# Patient Record
Sex: FEMALE | Race: WHITE | NOT HISPANIC OR LATINO | Employment: UNEMPLOYED | URBAN - METROPOLITAN AREA
[De-identification: names, ages, dates, MRNs, and addresses within clinical notes are randomized per-mention and may not be internally consistent; named-entity substitution may affect disease eponyms.]

---

## 2017-02-27 ENCOUNTER — OFFICE VISIT (OUTPATIENT)
Dept: FAMILY MEDICINE CLINIC | Facility: CLINIC | Age: 57
End: 2017-02-27

## 2017-02-27 VITALS
SYSTOLIC BLOOD PRESSURE: 130 MMHG | BODY MASS INDEX: 33.75 KG/M2 | DIASTOLIC BLOOD PRESSURE: 85 MMHG | WEIGHT: 210 LBS | HEART RATE: 77 BPM | OXYGEN SATURATION: 98 % | HEIGHT: 66 IN | TEMPERATURE: 98.1 F

## 2017-02-27 DIAGNOSIS — Z79.899 DRUG THERAPY: ICD-10-CM

## 2017-02-27 DIAGNOSIS — R53.83 OTHER FATIGUE: ICD-10-CM

## 2017-02-27 DIAGNOSIS — L72.3 INFECTED SEBACEOUS CYST: ICD-10-CM

## 2017-02-27 DIAGNOSIS — I10 ESSENTIAL HYPERTENSION: Primary | ICD-10-CM

## 2017-02-27 DIAGNOSIS — Z00.00 HEALTH CARE MAINTENANCE: ICD-10-CM

## 2017-02-27 DIAGNOSIS — L08.9 INFECTED SEBACEOUS CYST: ICD-10-CM

## 2017-02-27 PROCEDURE — 99213 OFFICE O/P EST LOW 20 MIN: CPT | Performed by: FAMILY MEDICINE

## 2017-02-27 PROCEDURE — 10060 I&D ABSCESS SIMPLE/SINGLE: CPT | Performed by: FAMILY MEDICINE

## 2017-02-27 RX ORDER — CEPHALEXIN 500 MG/1
500 CAPSULE ORAL 4 TIMES DAILY
Qty: 40 CAPSULE | Refills: 0 | Status: SHIPPED | OUTPATIENT
Start: 2017-02-27 | End: 2018-02-26

## 2017-02-27 NOTE — PROGRESS NOTES
Regan Nur is a 56 y.o. female. Presents today for   Chief Complaint   Patient presents with   • Hypertension     pt is here for follow up visit regarding her hypertension, also she has a spot on her back she needs looked at.       Rash   This is a new problem. Episode onset: 3 weeks. The problem has been gradually worsening since onset. The affected locations include the back. The rash is characterized by itchiness, redness and swelling. She was exposed to nothing. Pertinent negatives include no fever or shortness of breath. (Patient concerned as history of melanoma and has had resection in past.  Has had 13 or 14 biopsies.  Called dermatology and cannot get in until July 2017. ) Past treatments include nothing. The treatment provided no relief.   Hypertension   This is a chronic problem. The current episode started more than 1 year ago. The problem is unchanged. The problem is controlled. Associated symptoms include malaise/fatigue (Reports fatigued, would like thyroid checked as tired all time.). Pertinent negatives include no chest pain, orthopnea, palpitations, peripheral edema, PND or shortness of breath. (Has insurance now and due for labs.) There are no associated agents to hypertension. Risk factors for coronary artery disease include post-menopausal state. Past treatments include diuretics. The current treatment provides moderate improvement. There is no history of CAD/MI or CVA.    Has thickened nail would like looked at.    Review of Systems   Constitutional: Positive for malaise/fatigue (Reports fatigued, would like thyroid checked as tired all time.). Negative for fever.   Respiratory: Negative for shortness of breath.    Cardiovascular: Negative for chest pain, palpitations, orthopnea and PND.   Skin: Positive for rash.        Skin lesion;  Fungus on toenail       The following portions of the patient's history were reviewed and updated as appropriate: allergies, current medications,  "past medical history and problem list.    Patient Active Problem List   Diagnosis   • Essential hypertension   • Depression with anxiety   • Atypical chest pain   • Adult body mass index greater than 30   • Elevated blood-pressure reading without diagnosis of hypertension   • Adiposity   • Overweight   • Encounter for screening for other disorder       No Known Allergies    Current Outpatient Prescriptions on File Prior to Visit   Medication Sig Dispense Refill   • buPROPion XL (WELLBUTRIN XL) 300 MG 24 hr tablet Take 1 tablet by mouth Daily. 30 tablet 1   • hydrochlorothiazide (HYDRODIURIL) 25 MG tablet Take 1 tablet by mouth Daily. 30 tablet 2   • [DISCONTINUED] amLODIPine (NORVASC) 5 MG tablet      • albuterol (PROVENTIL HFA;VENTOLIN HFA) 108 (90 BASE) MCG/ACT inhaler ProAir  (90 Base) MCG/ACT Inhalation Aerosol Solution; Patient Sig: ProAir  (90 Base) MCG/ACT Inhalation Aerosol Solution ; 8; 0; 14-Nov-2015; Active     • fexofenadine (ALLEGRA) 180 MG tablet TAKE (1) TABLET DAILY 30 tablet 10   • montelukast (SINGULAIR) 10 MG tablet TAKE 1 TABLET ONCE DAILY AS DIRECTED. 30 tablet 10   • [DISCONTINUED] phentermine 37.5 MG capsule Take 1 capsule by mouth every morning. 90 capsule 1     No current facility-administered medications on file prior to visit.        Objective   Vitals:    02/27/17 1803 02/27/17 1826   BP: 146/90 130/85   BP Location: Left arm    Patient Position: Sitting    Cuff Size: Large Adult    Pulse: 77    Temp: 98.1 °F (36.7 °C)    TempSrc: Oral    SpO2: 98%    Weight: 210 lb (95.3 kg)    Height: 65.5\" (166.4 cm)        Physical Exam   Constitutional: She appears well-developed and well-nourished.   HENT:   Head: Normocephalic and atraumatic.   Neck: Neck supple. No JVD present. No thyromegaly present.   Cardiovascular: Normal rate, regular rhythm and normal heart sounds.  Exam reveals no gallop and no friction rub.    No murmur heard.  Pulmonary/Chest: Effort normal and breath sounds " normal. No respiratory distress. She has no wheezes. She has no rales.   Abdominal: Soft. Bowel sounds are normal. She exhibits no distension. There is no tenderness. There is no rebound and no guarding.   Musculoskeletal: She exhibits no edema.   Neurological: She is alert.   Skin: Skin is warm and dry.   Hallux nail thickened consistent with onychomycosis.  Low back 2 x 3 cm area of redness, mild warmth with underlying fluctuance.   Psychiatric: She has a normal mood and affect. Her behavior is normal.   Nursing note and vitals reviewed.  Incision & Drainage  Date/Time: 2/27/2017 7:55 PM  Performed by: ASIA DELEON  Authorized by: ASIA DELEON   Consent: Verbal consent obtained.  Risks and benefits: risks, benefits and alternatives were discussed  Consent given by: patient  Patient understanding: patient states understanding of the procedure being performed  Imaging studies: imaging studies available  Required items: required blood products, implants, devices, and special equipment available  Patient identity confirmed: verbally with patient  Type: abscess  Body area: trunk  Location details: back  Anesthesia: local infiltration    Anesthesia:  Anesthesia: local infiltration  Local Anesthetic: lidocaine 2% with epinephrine   Anesthetic total: 6 mL  Sedation:  Patient sedated: no    Scalpel size: 11  Needle gauge: 25 G.  Incision type: single straight  Drainage: purulent (cheesy material)  Drainage amount: moderate  Wound treatment: wound left open (covered with abx oint and sterile dressing.)  Packing material: none  Patient tolerance: Patient tolerated the procedure well with no immediate complications            Assessment/Plan   Loren was seen today for hypertension.    Diagnoses and all orders for this visit:    Essential hypertension  -     Comprehensive Metabolic Panel    Other fatigue  -     CBC & Differential  -     Comprehensive Metabolic Panel  -     TSH  -     Vitamin B12  -     T3, Free  -      T4, Free    Health care maintenance  -     Lipid Panel    Drug therapy  -     CBC & Differential  -     Comprehensive Metabolic Panel    Infected sebaceous cyst  -     Culture, Routine  -     Incision & Drainage  -     cephalexin (KEFLEX) 500 MG capsule; Take 1 capsule by mouth 4 (Four) Times a Day.      -thickened nail - would just watch, consider lamisil  -bp adequate control, continue medication  -fatigue - will check labs  -reassured the material expressed was pathognomic for epidermoid inclusion cyst (sebaceous cyst).       -Follow up: 3 months       Current Outpatient Prescriptions:   •  buPROPion XL (WELLBUTRIN XL) 300 MG 24 hr tablet, Take 1 tablet by mouth Daily., Disp: 30 tablet, Rfl: 1  •  hydrochlorothiazide (HYDRODIURIL) 25 MG tablet, Take 1 tablet by mouth Daily., Disp: 30 tablet, Rfl: 2  •  albuterol (PROVENTIL HFA;VENTOLIN HFA) 108 (90 BASE) MCG/ACT inhaler, ProAir  (90 Base) MCG/ACT Inhalation Aerosol Solution; Patient Sig: ProAir  (90 Base) MCG/ACT Inhalation Aerosol Solution ; 8; 0; 14-Nov-2015; Active, Disp: , Rfl:   •  cephalexin (KEFLEX) 500 MG capsule, Take 1 capsule by mouth 4 (Four) Times a Day., Disp: 40 capsule, Rfl: 0  •  fexofenadine (ALLEGRA) 180 MG tablet, TAKE (1) TABLET DAILY, Disp: 30 tablet, Rfl: 10  •  montelukast (SINGULAIR) 10 MG tablet, TAKE 1 TABLET ONCE DAILY AS DIRECTED., Disp: 30 tablet, Rfl: 10

## 2017-02-28 ENCOUNTER — TELEPHONE (OUTPATIENT)
Dept: FAMILY MEDICINE CLINIC | Facility: CLINIC | Age: 57
End: 2017-02-28

## 2017-02-28 LAB
ALBUMIN SERPL-MCNC: 4.5 G/DL (ref 3.5–5.2)
ALBUMIN/GLOB SERPL: 2 G/DL
ALP SERPL-CCNC: 60 U/L (ref 39–117)
ALT SERPL-CCNC: 26 U/L (ref 1–33)
AST SERPL-CCNC: 18 U/L (ref 1–32)
BASOPHILS # BLD AUTO: 0.02 10*3/MM3 (ref 0–0.2)
BASOPHILS NFR BLD AUTO: 0.3 % (ref 0–1.5)
BILIRUB SERPL-MCNC: 0.4 MG/DL (ref 0.1–1.2)
BUN SERPL-MCNC: 16 MG/DL (ref 6–20)
BUN/CREAT SERPL: 17.2 (ref 7–25)
CALCIUM SERPL-MCNC: 9.6 MG/DL (ref 8.6–10.5)
CHLORIDE SERPL-SCNC: 100 MMOL/L (ref 98–107)
CHOLEST SERPL-MCNC: 190 MG/DL (ref 0–200)
CO2 SERPL-SCNC: 28 MMOL/L (ref 22–29)
CREAT SERPL-MCNC: 0.93 MG/DL (ref 0.57–1)
EOSINOPHIL # BLD AUTO: 0.1 10*3/MM3 (ref 0–0.7)
EOSINOPHIL NFR BLD AUTO: 1.4 % (ref 0.3–6.2)
ERYTHROCYTE [DISTWIDTH] IN BLOOD BY AUTOMATED COUNT: 13.4 % (ref 11.7–13)
GLOBULIN SER CALC-MCNC: 2.3 GM/DL
GLUCOSE SERPL-MCNC: 87 MG/DL (ref 65–99)
HCT VFR BLD AUTO: 44.1 % (ref 35.6–45.5)
HDLC SERPL-MCNC: 57 MG/DL (ref 40–60)
HGB BLD-MCNC: 14.6 G/DL (ref 11.9–15.5)
IMM GRANULOCYTES # BLD: 0.02 10*3/MM3 (ref 0–0.03)
IMM GRANULOCYTES NFR BLD: 0.3 % (ref 0–0.5)
LDLC SERPL CALC-MCNC: 113 MG/DL (ref 0–100)
LYMPHOCYTES # BLD AUTO: 1.3 10*3/MM3 (ref 0.9–4.8)
LYMPHOCYTES NFR BLD AUTO: 17.8 % (ref 19.6–45.3)
MCH RBC QN AUTO: 30.8 PG (ref 26.9–32)
MCHC RBC AUTO-ENTMCNC: 33.1 G/DL (ref 32.4–36.3)
MCV RBC AUTO: 93 FL (ref 80.5–98.2)
MONOCYTES # BLD AUTO: 0.69 10*3/MM3 (ref 0.2–1.2)
MONOCYTES NFR BLD AUTO: 9.4 % (ref 5–12)
NEUTROPHILS # BLD AUTO: 5.18 10*3/MM3 (ref 1.9–8.1)
NEUTROPHILS NFR BLD AUTO: 70.8 % (ref 42.7–76)
PLATELET # BLD AUTO: 206 10*3/MM3 (ref 140–500)
POTASSIUM SERPL-SCNC: 4.4 MMOL/L (ref 3.5–5.2)
PROT SERPL-MCNC: 6.8 G/DL (ref 6–8.5)
RBC # BLD AUTO: 4.74 10*6/MM3 (ref 3.9–5.2)
SODIUM SERPL-SCNC: 141 MMOL/L (ref 136–145)
TRIGL SERPL-MCNC: 102 MG/DL (ref 0–150)
TSH SERPL DL<=0.005 MIU/L-ACNC: 2.82 MIU/ML (ref 0.27–4.2)
VIT B12 SERPL-MCNC: 519 PG/ML (ref 211–946)
VLDLC SERPL CALC-MCNC: 20.4 MG/DL (ref 5–40)
WBC # BLD AUTO: 7.31 10*3/MM3 (ref 4.5–10.7)

## 2017-03-01 DIAGNOSIS — Z79.899 DRUG THERAPY: Primary | ICD-10-CM

## 2017-03-01 RX ORDER — TERBINAFINE HYDROCHLORIDE 250 MG/1
250 TABLET ORAL DAILY
Qty: 30 TABLET | Refills: 2 | Status: SHIPPED | OUTPATIENT
Start: 2017-03-01 | End: 2018-02-26

## 2017-03-02 ENCOUNTER — TELEPHONE (OUTPATIENT)
Dept: FAMILY MEDICINE CLINIC | Facility: CLINIC | Age: 57
End: 2017-03-02

## 2017-03-02 RX ORDER — TOPIRAMATE 50 MG/1
TABLET, FILM COATED ORAL
Qty: 30 TABLET | Refills: 1 | Status: SHIPPED | OUTPATIENT
Start: 2017-03-02 | End: 2018-02-26

## 2017-03-03 LAB
BACTERIA SPEC AEROBE CULT: ABNORMAL
BACTERIA SPEC CULT: ABNORMAL
OTHER ANTIBIOTIC SUSC ISLT: ABNORMAL
SPECIMEN STATUS: NORMAL
T3FREE SERPL-MCNC: NORMAL PG/ML
T4 FREE SERPL-MCNC: NORMAL NG/DL

## 2017-03-06 RX ORDER — SULFAMETHOXAZOLE AND TRIMETHOPRIM 800; 160 MG/1; MG/1
1 TABLET ORAL 2 TIMES DAILY
Qty: 14 TABLET | Refills: 0 | Status: SHIPPED | OUTPATIENT
Start: 2017-03-06 | End: 2018-02-26

## 2017-03-15 ENCOUNTER — TELEPHONE (OUTPATIENT)
Dept: FAMILY MEDICINE CLINIC | Facility: CLINIC | Age: 57
End: 2017-03-15

## 2017-03-16 NOTE — TELEPHONE ENCOUNTER
She had a sebaceous cyst that was infected.  Usually come back and only treat if infected.  Signs of infection would be redness over top, warmth and pain.  If none of this, I would just watch.  If redness, warmth and painful to touch or fevers, she needs to be seen.    RRJ

## 2017-03-29 ENCOUNTER — RESULTS ENCOUNTER (OUTPATIENT)
Dept: FAMILY MEDICINE CLINIC | Facility: CLINIC | Age: 57
End: 2017-03-29

## 2017-03-29 DIAGNOSIS — Z79.899 DRUG THERAPY: ICD-10-CM

## 2017-04-03 RX ORDER — HYDROCHLOROTHIAZIDE 25 MG/1
TABLET ORAL
Qty: 30 TABLET | Refills: 6 | Status: SHIPPED | OUTPATIENT
Start: 2017-04-03 | End: 2017-09-11 | Stop reason: SDUPTHER

## 2017-04-20 LAB
ALBUMIN SERPL-MCNC: 4.1 G/DL (ref 3.5–5.2)
ALP SERPL-CCNC: 58 U/L (ref 39–117)
ALT SERPL-CCNC: 22 U/L (ref 1–33)
AST SERPL-CCNC: 18 U/L (ref 1–32)
BILIRUB DIRECT SERPL-MCNC: <0.2 MG/DL (ref 0–0.3)
BILIRUB SERPL-MCNC: 0.3 MG/DL (ref 0.1–1.2)
PROT SERPL-MCNC: 6.9 G/DL (ref 6–8.5)

## 2017-04-24 ENCOUNTER — TELEPHONE (OUTPATIENT)
Dept: FAMILY MEDICINE CLINIC | Facility: CLINIC | Age: 57
End: 2017-04-24

## 2017-04-24 NOTE — TELEPHONE ENCOUNTER
----- Message from David Cantu DO sent at 4/22/2017  8:22 PM EDT -----  Call results to patient.  Hepatic function panel is normal.

## 2017-06-30 ENCOUNTER — TELEPHONE (OUTPATIENT)
Dept: FAMILY MEDICINE CLINIC | Facility: CLINIC | Age: 57
End: 2017-06-30

## 2017-08-21 DIAGNOSIS — F32.A DEPRESSION, UNSPECIFIED DEPRESSION TYPE: ICD-10-CM

## 2017-08-21 RX ORDER — BUPROPION HYDROCHLORIDE 300 MG/1
TABLET ORAL
Qty: 30 TABLET | Refills: 1 | Status: SHIPPED | OUTPATIENT
Start: 2017-08-21 | End: 2017-09-11 | Stop reason: SDUPTHER

## 2017-09-11 ENCOUNTER — TELEPHONE (OUTPATIENT)
Dept: FAMILY MEDICINE CLINIC | Facility: CLINIC | Age: 57
End: 2017-09-11

## 2017-09-11 DIAGNOSIS — F32.A DEPRESSION, UNSPECIFIED DEPRESSION TYPE: ICD-10-CM

## 2017-09-11 RX ORDER — MONTELUKAST SODIUM 10 MG/1
10 TABLET ORAL NIGHTLY
Qty: 180 TABLET | Refills: 1 | Status: SHIPPED | OUTPATIENT
Start: 2017-09-11 | End: 2018-12-10 | Stop reason: SDUPTHER

## 2017-09-11 RX ORDER — HYDROCHLOROTHIAZIDE 25 MG/1
25 TABLET ORAL DAILY
Qty: 180 TABLET | Refills: 1 | Status: SHIPPED | OUTPATIENT
Start: 2017-09-11 | End: 2018-10-08 | Stop reason: SDUPTHER

## 2017-09-11 RX ORDER — FEXOFENADINE HCL 180 MG/1
180 TABLET ORAL DAILY
Qty: 180 TABLET | Refills: 1 | Status: SHIPPED | OUTPATIENT
Start: 2017-09-11 | End: 2018-01-19 | Stop reason: SDUPTHER

## 2017-09-11 RX ORDER — BUPROPION HYDROCHLORIDE 300 MG/1
300 TABLET ORAL EVERY MORNING
Qty: 90 TABLET | Refills: 1 | Status: SHIPPED | OUTPATIENT
Start: 2017-09-11 | End: 2018-03-29 | Stop reason: SDUPTHER

## 2018-01-03 ENCOUNTER — TELEPHONE (OUTPATIENT)
Dept: FAMILY MEDICINE CLINIC | Facility: CLINIC | Age: 58
End: 2018-01-03

## 2018-01-03 RX ORDER — BENZONATATE 100 MG/1
100 CAPSULE ORAL 3 TIMES DAILY PRN
Qty: 15 CAPSULE | Refills: 0 | Status: SHIPPED | OUTPATIENT
Start: 2018-01-03 | End: 2018-02-26

## 2018-01-03 NOTE — TELEPHONE ENCOUNTER
I sent a few tessalon perles but if the cough continues she has to be seen before anything else can be called in.  I have openings tomorrow.

## 2018-01-19 ENCOUNTER — TELEPHONE (OUTPATIENT)
Dept: FAMILY MEDICINE CLINIC | Facility: CLINIC | Age: 58
End: 2018-01-19

## 2018-01-19 RX ORDER — FEXOFENADINE HCL 180 MG/1
TABLET ORAL
Qty: 30 TABLET | Refills: 0 | Status: SHIPPED | OUTPATIENT
Start: 2018-01-19 | End: 2018-03-01 | Stop reason: SDUPTHER

## 2018-01-19 RX ORDER — AMOXICILLIN 875 MG/1
875 TABLET, COATED ORAL EVERY 12 HOURS SCHEDULED
Qty: 20 TABLET | Refills: 0 | Status: SHIPPED | OUTPATIENT
Start: 2018-01-19 | End: 2018-01-29

## 2018-02-19 ENCOUNTER — TELEPHONE (OUTPATIENT)
Dept: FAMILY MEDICINE CLINIC | Facility: CLINIC | Age: 58
End: 2018-02-19

## 2018-02-20 RX ORDER — AMOXICILLIN 875 MG/1
875 TABLET, COATED ORAL EVERY 12 HOURS SCHEDULED
Qty: 14 TABLET | Refills: 0 | Status: SHIPPED | OUTPATIENT
Start: 2018-02-20 | End: 2018-05-15

## 2018-02-20 NOTE — TELEPHONE ENCOUNTER
Her  has end stage dementia and can no longer leave him and too hard to travel now as seizures.   I can send something, but can she come in a late Monday to double check as could be something very serious?    Amoxil 875mg 1 po bid x 7 days - until I can see next Monday night

## 2018-02-26 ENCOUNTER — OFFICE VISIT (OUTPATIENT)
Dept: FAMILY MEDICINE CLINIC | Facility: CLINIC | Age: 58
End: 2018-02-26

## 2018-02-26 VITALS
BODY MASS INDEX: 33.75 KG/M2 | WEIGHT: 210 LBS | HEIGHT: 66 IN | HEART RATE: 96 BPM | OXYGEN SATURATION: 94 % | DIASTOLIC BLOOD PRESSURE: 80 MMHG | TEMPERATURE: 98.1 F | SYSTOLIC BLOOD PRESSURE: 120 MMHG

## 2018-02-26 DIAGNOSIS — I10 ESSENTIAL HYPERTENSION: ICD-10-CM

## 2018-02-26 DIAGNOSIS — L03.213 CELLULITIS OF PERIORBITAL REGION OF BOTH EYES: Primary | ICD-10-CM

## 2018-02-26 DIAGNOSIS — Z23 IMMUNIZATION DUE: ICD-10-CM

## 2018-02-26 PROCEDURE — 99213 OFFICE O/P EST LOW 20 MIN: CPT | Performed by: FAMILY MEDICINE

## 2018-02-26 PROCEDURE — 90686 IIV4 VACC NO PRSV 0.5 ML IM: CPT | Performed by: FAMILY MEDICINE

## 2018-02-26 PROCEDURE — 90471 IMMUNIZATION ADMIN: CPT | Performed by: FAMILY MEDICINE

## 2018-02-26 NOTE — PROGRESS NOTES
Regan Nur is a 57 y.o. female. Presents today for   Chief Complaint   Patient presents with   • Eye Problem     pt is here for eye infection       Eye Problem    Both eyes are affected.This is a new problem. The current episode started in the past 7 days. Episode frequency: resolved now. The problem has been resolved. There was no injury mechanism. The patient is experiencing no pain. There is known exposure to pink eye. She does not wear contacts. Associated symptoms include eye redness (periorbital redness and peeling, gone now). Pertinent negatives include no blurred vision, double vision, fever, nausea, recent URI or vomiting. Treatments tried: called in abx for aptient as not able to leave  and come during day.   The treatment provided moderate relief.   Hypertension   This is a chronic problem. The current episode started more than 1 year ago. The problem is unchanged. The problem is controlled. Pertinent negatives include no blurred vision, orthopnea, palpitations, peripheral edema, PND or shortness of breath. There are no associated agents to hypertension. Past treatments include diuretics. The current treatment provides moderate improvement. There are no compliance problems.        Review of Systems   Constitutional: Negative for fever.   Eyes: Positive for redness (periorbital redness and peeling, gone now). Negative for blurred vision and double vision.   Respiratory: Negative for shortness of breath.    Cardiovascular: Negative for palpitations, orthopnea and PND.   Gastrointestinal: Negative for nausea and vomiting.       The following portions of the patient's history were reviewed and updated as appropriate: allergies, current medications, past medical history and problem list.    Patient Active Problem List   Diagnosis   • Essential hypertension   • Depression with anxiety   • Atypical chest pain   • Adult body mass index greater than 30   • Elevated blood-pressure reading  "without diagnosis of hypertension   • Adiposity   • Overweight   • Encounter for screening for other disorder       No Known Allergies    Current Outpatient Prescriptions on File Prior to Visit   Medication Sig Dispense Refill   • albuterol (PROVENTIL HFA;VENTOLIN HFA) 108 (90 BASE) MCG/ACT inhaler ProAir  (90 Base) MCG/ACT Inhalation Aerosol Solution; Patient Sig: ProAir  (90 Base) MCG/ACT Inhalation Aerosol Solution ; 8; 0; 14-Nov-2015; Active     • amoxicillin (AMOXIL) 875 MG tablet Take 1 tablet by mouth Every 12 (Twelve) Hours. 14 tablet 0   • buPROPion XL (WELLBUTRIN XL) 300 MG 24 hr tablet Take 1 tablet by mouth Every Morning. 90 tablet 1   • fexofenadine (ALLEGRA) 180 MG tablet TAKE (1) TABLET DAILY 30 tablet 0   • hydrochlorothiazide (HYDRODIURIL) 25 MG tablet Take 1 tablet by mouth Daily. 180 tablet 1   • montelukast (SINGULAIR) 10 MG tablet Take 1 tablet by mouth Every Night. 180 tablet 1   • [DISCONTINUED] terbinafine (lamiSIL) 250 MG tablet Take 1 tablet by mouth Daily. 30 tablet 2   • [DISCONTINUED] topiramate (TOPAMAX) 50 MG tablet 1/2 at night for 5 days, then 1/2 twice daiy for 5 days, and then 1/2 in the am and 1 at night for 5 days, then bid 30 tablet 1   • [DISCONTINUED] benzonatate (TESSALON PERLES) 100 MG capsule Take 1 capsule by mouth 3 (Three) Times a Day As Needed for Cough. 15 capsule 0   • [DISCONTINUED] cephalexin (KEFLEX) 500 MG capsule Take 1 capsule by mouth 4 (Four) Times a Day. 40 capsule 0   • [DISCONTINUED] sulfamethoxazole-trimethoprim (BACTRIM DS) 800-160 MG per tablet Take 1 tablet by mouth 2 (Two) Times a Day. 14 tablet 0     No current facility-administered medications on file prior to visit.        Objective   Vitals:    02/26/18 1754   BP: 120/80   BP Location: Left arm   Patient Position: Sitting   Cuff Size: Large Adult   Pulse: 96   Temp: 98.1 °F (36.7 °C)   TempSrc: Oral   SpO2: 94%   Weight: 95.3 kg (210 lb)   Height: 166.4 cm (65.51\")       Physical Exam "   Constitutional: She is oriented to person, place, and time. She appears well-developed and well-nourished.   Eyes: Conjunctivae, EOM and lids are normal. Pupils are equal, round, and reactive to light.   Neurological: She is alert and oriented to person, place, and time.   Skin: Skin is warm and dry.   Psychiatric: She has a normal mood and affect. Her behavior is normal.   Nursing note and vitals reviewed.      Assessment/Plan   Loren was seen today for eye problem.    Diagnoses and all orders for this visit:    Cellulitis of periorbital region of both eyes    Immunization due  -     Flu Vaccine Quad PF 3YR+ (FLUARIX/FLUZONE 1102-6182)    Essential hypertension    -hypertension - controlled, continue medications  -skin around eye looks good now;  Take last dose of abx            -Follow up: 6 months       Current Outpatient Prescriptions:   •  albuterol (PROVENTIL HFA;VENTOLIN HFA) 108 (90 BASE) MCG/ACT inhaler, ProAir  (90 Base) MCG/ACT Inhalation Aerosol Solution; Patient Sig: ProAir  (90 Base) MCG/ACT Inhalation Aerosol Solution ; 8; 0; 14-Nov-2015; Active, Disp: , Rfl:   •  amoxicillin (AMOXIL) 875 MG tablet, Take 1 tablet by mouth Every 12 (Twelve) Hours., Disp: 14 tablet, Rfl: 0  •  buPROPion XL (WELLBUTRIN XL) 300 MG 24 hr tablet, Take 1 tablet by mouth Every Morning., Disp: 90 tablet, Rfl: 1  •  fexofenadine (ALLEGRA) 180 MG tablet, TAKE (1) TABLET DAILY, Disp: 30 tablet, Rfl: 0  •  hydrochlorothiazide (HYDRODIURIL) 25 MG tablet, Take 1 tablet by mouth Daily., Disp: 180 tablet, Rfl: 1  •  montelukast (SINGULAIR) 10 MG tablet, Take 1 tablet by mouth Every Night., Disp: 180 tablet, Rfl: 1

## 2018-03-01 RX ORDER — FEXOFENADINE HCL 180 MG/1
TABLET ORAL
Qty: 30 TABLET | Refills: 6 | Status: SHIPPED | OUTPATIENT
Start: 2018-03-01 | End: 2021-04-22

## 2018-03-29 DIAGNOSIS — F32.A DEPRESSION, UNSPECIFIED DEPRESSION TYPE: ICD-10-CM

## 2018-03-29 RX ORDER — BUPROPION HYDROCHLORIDE 300 MG/1
TABLET ORAL
Qty: 30 TABLET | Refills: 6 | Status: SHIPPED | OUTPATIENT
Start: 2018-03-29 | End: 2018-11-14 | Stop reason: SDUPTHER

## 2018-05-07 RX ORDER — DOXYCYCLINE HYCLATE 100 MG/1
100 CAPSULE ORAL 2 TIMES DAILY
Qty: 20 CAPSULE | Refills: 0 | Status: SHIPPED | OUTPATIENT
Start: 2018-05-07 | End: 2018-05-15

## 2018-05-07 NOTE — PROGRESS NOTES
Patient here with , has small infected sebaceous cyst, directed to use warm compresses and will send in abx.  RRJ

## 2018-05-15 ENCOUNTER — OFFICE VISIT (OUTPATIENT)
Dept: FAMILY MEDICINE CLINIC | Facility: CLINIC | Age: 58
End: 2018-05-15

## 2018-05-15 ENCOUNTER — TELEPHONE (OUTPATIENT)
Dept: FAMILY MEDICINE CLINIC | Facility: CLINIC | Age: 58
End: 2018-05-15

## 2018-05-15 VITALS
BODY MASS INDEX: 35.22 KG/M2 | TEMPERATURE: 98.6 F | OXYGEN SATURATION: 98 % | WEIGHT: 215 LBS | HEART RATE: 76 BPM | DIASTOLIC BLOOD PRESSURE: 76 MMHG | SYSTOLIC BLOOD PRESSURE: 130 MMHG

## 2018-05-15 DIAGNOSIS — L08.9 INFECTED SEBACEOUS CYST: Primary | ICD-10-CM

## 2018-05-15 DIAGNOSIS — L72.3 INFECTED SEBACEOUS CYST: Primary | ICD-10-CM

## 2018-05-15 PROCEDURE — 99213 OFFICE O/P EST LOW 20 MIN: CPT | Performed by: FAMILY MEDICINE

## 2018-05-15 RX ORDER — SULFAMETHOXAZOLE AND TRIMETHOPRIM 800; 160 MG/1; MG/1
1 TABLET ORAL 2 TIMES DAILY
Qty: 20 TABLET | Refills: 0 | Status: SHIPPED | OUTPATIENT
Start: 2018-05-15 | End: 2019-04-23

## 2018-05-15 NOTE — PROGRESS NOTES
Regan Nur is a 57 y.o. female. Presents today for   Chief Complaint   Patient presents with   • Follow-up     lesion with drainage on her back       Cyst   This is a recurrent (had infected sebacous cyst in past same location) problem. The current episode started 1 to 4 weeks ago. The problem occurs constantly. The problem has been unchanged. Pertinent negatives include no chills, fever, nausea or vomiting. Associated symptoms comments: Painful, hot to touch. Nothing aggravates the symptoms. Treatments tried: on abx. The treatment provided no relief.       Review of Systems   Constitutional: Negative for chills and fever.   Gastrointestinal: Negative for nausea and vomiting.       The following portions of the patient's history were reviewed and updated as appropriate: allergies, current medications, past medical history and problem list.    Patient Active Problem List   Diagnosis   • Essential hypertension   • Depression with anxiety   • Atypical chest pain   • Adult body mass index greater than 30   • Elevated blood-pressure reading without diagnosis of hypertension   • Adiposity   • Overweight   • Encounter for screening for other disorder       No Known Allergies    Current Outpatient Prescriptions on File Prior to Visit   Medication Sig Dispense Refill   • albuterol (PROVENTIL HFA;VENTOLIN HFA) 108 (90 BASE) MCG/ACT inhaler ProAir  (90 Base) MCG/ACT Inhalation Aerosol Solution; Patient Sig: ProAir  (90 Base) MCG/ACT Inhalation Aerosol Solution ; 8; 0; 14-Nov-2015; Active     • buPROPion XL (WELLBUTRIN XL) 300 MG 24 hr tablet TAKE 1 TABLET IN THE MORNING 30 tablet 6   • fexofenadine (ALLEGRA) 180 MG tablet TAKE (1) TABLET DAILY 30 tablet 6   • hydrochlorothiazide (HYDRODIURIL) 25 MG tablet Take 1 tablet by mouth Daily. 180 tablet 1   • montelukast (SINGULAIR) 10 MG tablet Take 1 tablet by mouth Every Night. 180 tablet 1     No current facility-administered medications on file  prior to visit.        Objective   Vitals:    05/15/18 1554   BP: 130/76   Pulse: 76   Temp: 98.6 °F (37 °C)   SpO2: 98%   Weight: 97.5 kg (215 lb)         Physical Exam   Constitutional: She is oriented to person, place, and time. She appears well-developed and well-nourished.   Neurological: She is alert and oriented to person, place, and time.   Skin: Skin is warm and dry.        Psychiatric: She has a normal mood and affect. Her behavior is normal.   Nursing note and vitals reviewed.    Incision & Drainage  Date/Time: 5/20/2018 6:57 AM  Performed by: ASIA DELEON  Authorized by: ASIA DELEON   Consent: Verbal consent obtained.  Risks and benefits: risks, benefits and alternatives were discussed  Consent given by: patient  Patient understanding: patient states understanding of the procedure being performed  Site marked: the operative site was marked  Required items: required blood products, implants, devices, and special equipment available  Patient identity confirmed: verbally with patient  Type: abscess  Body area: upper extremity  Location details: left wrist  Anesthesia: local infiltration    Anesthesia:  Local Anesthetic: lidocaine 2% without epinephrine  Anesthetic total: 5 mL    Sedation:  Patient sedated: no  Scalpel size: 10  Incision type: single straight  Drainage: purulent (cheesy material (keritin debris).)  Drainage amount: copious  Wound treatment: wound left open  Packing material: none  Patient tolerance: Patient tolerated the procedure well with no immediate complications          Assessment/Plan   Loren was seen today for follow-up.    Diagnoses and all orders for this visit:    Infected sebaceous cyst  -     sulfamethoxazole-trimethoprim (BACTRIM DS,SEPTRA DS) 800-160 MG per tablet; Take 1 tablet by mouth 2 (Two) Times a Day.  -     Culture, Routine - Swab, Back  -     Incision & Drainage    Other orders  -     Please Note    abx as directed, allow to drain.  Once healed d/w  possible referral for resection.  Patient primary caretaker of  how has early onset dementia and seizures and too challenging to leave his side.         -Follow up: Prn - RTC if worse or no improvement.          Current Outpatient Prescriptions:   •  albuterol (PROVENTIL HFA;VENTOLIN HFA) 108 (90 BASE) MCG/ACT inhaler, ProAir  (90 Base) MCG/ACT Inhalation Aerosol Solution; Patient Sig: ProAir  (90 Base) MCG/ACT Inhalation Aerosol Solution ; 8; 0; 14-Nov-2015; Active, Disp: , Rfl:   •  buPROPion XL (WELLBUTRIN XL) 300 MG 24 hr tablet, TAKE 1 TABLET IN THE MORNING, Disp: 30 tablet, Rfl: 6  •  fexofenadine (ALLEGRA) 180 MG tablet, TAKE (1) TABLET DAILY, Disp: 30 tablet, Rfl: 6  •  hydrochlorothiazide (HYDRODIURIL) 25 MG tablet, Take 1 tablet by mouth Daily., Disp: 180 tablet, Rfl: 1  •  montelukast (SINGULAIR) 10 MG tablet, Take 1 tablet by mouth Every Night., Disp: 180 tablet, Rfl: 1  •  sulfamethoxazole-trimethoprim (BACTRIM DS,SEPTRA DS) 800-160 MG per tablet, Take 1 tablet by mouth 2 (Two) Times a Day., Disp: 20 tablet, Rfl: 0

## 2018-05-18 LAB
BACTERIA SPEC AEROBE CULT: NORMAL
BACTERIA SPEC CULT: NORMAL
Lab: NORMAL

## 2018-05-20 PROCEDURE — 10060 I&D ABSCESS SIMPLE/SINGLE: CPT | Performed by: FAMILY MEDICINE

## 2018-05-22 ENCOUNTER — TELEPHONE (OUTPATIENT)
Dept: FAMILY MEDICINE CLINIC | Facility: CLINIC | Age: 58
End: 2018-05-22

## 2018-05-22 RX ORDER — ALBUTEROL SULFATE 90 UG/1
2 AEROSOL, METERED RESPIRATORY (INHALATION) EVERY 6 HOURS PRN
Qty: 6.7 G | Refills: 11 | Status: SHIPPED | OUTPATIENT
Start: 2018-05-22 | End: 2018-10-17 | Stop reason: SDUPTHER

## 2018-06-07 ENCOUNTER — TELEPHONE (OUTPATIENT)
Dept: FAMILY MEDICINE CLINIC | Facility: CLINIC | Age: 58
End: 2018-06-07

## 2018-06-07 RX ORDER — DOXYCYCLINE HYCLATE 100 MG/1
100 CAPSULE ORAL 2 TIMES DAILY
Qty: 20 CAPSULE | Refills: 0 | Status: SHIPPED | OUTPATIENT
Start: 2018-06-07 | End: 2018-06-17

## 2018-06-07 NOTE — TELEPHONE ENCOUNTER
I know it's hard to get in here, try doxycycline 100mg 1 po bid x 10days and come in if this doesn't improve.    RRJ

## 2018-10-08 RX ORDER — HYDROCHLOROTHIAZIDE 25 MG/1
TABLET ORAL
Qty: 30 TABLET | Refills: 1 | Status: SHIPPED | OUTPATIENT
Start: 2018-10-08 | End: 2018-12-17 | Stop reason: SDUPTHER

## 2018-10-17 RX ORDER — ALBUTEROL SULFATE 90 UG/1
2 AEROSOL, METERED RESPIRATORY (INHALATION) EVERY 6 HOURS PRN
Qty: 6.7 G | Refills: 11 | Status: SHIPPED | OUTPATIENT
Start: 2018-10-17 | End: 2019-04-23 | Stop reason: SDUPTHER

## 2018-11-14 DIAGNOSIS — F32.A DEPRESSION, UNSPECIFIED DEPRESSION TYPE: ICD-10-CM

## 2018-11-14 RX ORDER — BUPROPION HYDROCHLORIDE 300 MG/1
TABLET ORAL
Qty: 30 TABLET | Refills: 0 | Status: SHIPPED | OUTPATIENT
Start: 2018-11-14 | End: 2018-12-17 | Stop reason: SDUPTHER

## 2018-11-29 ENCOUNTER — TELEPHONE (OUTPATIENT)
Dept: FAMILY MEDICINE CLINIC | Facility: CLINIC | Age: 58
End: 2018-11-29

## 2018-11-30 RX ORDER — AZITHROMYCIN 250 MG/1
TABLET, FILM COATED ORAL
Qty: 6 TABLET | Refills: 0 | Status: SHIPPED | OUTPATIENT
Start: 2018-11-30 | End: 2019-04-23

## 2018-12-10 RX ORDER — MONTELUKAST SODIUM 10 MG/1
TABLET ORAL
Qty: 30 TABLET | Refills: 6 | Status: SHIPPED | OUTPATIENT
Start: 2018-12-10 | End: 2020-01-02

## 2018-12-17 DIAGNOSIS — F32.A DEPRESSION, UNSPECIFIED DEPRESSION TYPE: ICD-10-CM

## 2018-12-17 RX ORDER — HYDROCHLOROTHIAZIDE 25 MG/1
TABLET ORAL
Qty: 30 TABLET | Refills: 6 | Status: SHIPPED | OUTPATIENT
Start: 2018-12-17 | End: 2019-04-23

## 2018-12-17 RX ORDER — BUPROPION HYDROCHLORIDE 300 MG/1
TABLET ORAL
Qty: 30 TABLET | Refills: 6 | Status: SHIPPED | OUTPATIENT
Start: 2018-12-17 | End: 2019-08-02 | Stop reason: SDUPTHER

## 2019-04-23 ENCOUNTER — OFFICE VISIT (OUTPATIENT)
Dept: FAMILY MEDICINE CLINIC | Facility: CLINIC | Age: 59
End: 2019-04-23

## 2019-04-23 VITALS
HEART RATE: 95 BPM | WEIGHT: 210 LBS | DIASTOLIC BLOOD PRESSURE: 80 MMHG | SYSTOLIC BLOOD PRESSURE: 126 MMHG | OXYGEN SATURATION: 98 % | TEMPERATURE: 98.1 F | BODY MASS INDEX: 34.4 KG/M2

## 2019-04-23 DIAGNOSIS — G89.29 CHRONIC PAIN OF RIGHT KNEE: ICD-10-CM

## 2019-04-23 DIAGNOSIS — Z79.899 DRUG THERAPY: ICD-10-CM

## 2019-04-23 DIAGNOSIS — I10 ESSENTIAL HYPERTENSION: Primary | ICD-10-CM

## 2019-04-23 DIAGNOSIS — Z85.820 HISTORY OF MELANOMA: ICD-10-CM

## 2019-04-23 DIAGNOSIS — G90.511 COMPLEX REGIONAL PAIN SYNDROME TYPE 1 AFFECTING RIGHT UPPER ARM: ICD-10-CM

## 2019-04-23 DIAGNOSIS — M25.561 CHRONIC PAIN OF RIGHT KNEE: ICD-10-CM

## 2019-04-23 PROCEDURE — 99214 OFFICE O/P EST MOD 30 MIN: CPT | Performed by: FAMILY MEDICINE

## 2019-04-23 RX ORDER — HYDROCHLOROTHIAZIDE 25 MG/1
12.5 TABLET ORAL DAILY
Qty: 30 TABLET | Refills: 6
Start: 2019-04-23 | End: 2019-06-17

## 2019-04-23 RX ORDER — ALBUTEROL SULFATE 90 UG/1
2 AEROSOL, METERED RESPIRATORY (INHALATION) EVERY 6 HOURS PRN
Qty: 6.7 G | Refills: 11 | Status: SHIPPED | OUTPATIENT
Start: 2019-04-23 | End: 2020-09-21

## 2019-04-23 NOTE — PROGRESS NOTES
Regan Nur is a 58 y.o. female. Presents today for   Chief Complaint   Patient presents with   • Dizziness     dizzy and legs swelling off and on.  fell x 2 months ago and lt knee pain.  lt side of neck mole concerning her.  had cancer in rt arm 2011 and having trouble raising her arm now.   • Hypertension       Leg Swelling   This is a chronic problem. The current episode started more than 1 year ago. The problem occurs intermittently. The problem has been waxing and waning. Associated symptoms include arthralgias. Pertinent negatives include no abdominal pain, chest pain, headaches, myalgias, nausea, numbness, vertigo, visual change, vomiting or weakness. Associated symptoms comments: No dyspnea; no pnd/orthopnea.  . Treatments tried: better wtih elevation, but on feet frequently. The treatment provided no relief.   Dizziness   This is a new problem. The current episode started more than 1 month ago. The problem occurs intermittently. The problem has been waxing and waning. Associated symptoms include arthralgias. Pertinent negatives include no abdominal pain, chest pain, headaches, myalgias, nausea, numbness, vertigo, visual change, vomiting or weakness. Associated symptoms comments: Occurs with change of position.  No syncope. She has tried nothing for the symptoms. The treatment provided no relief.   Knee Pain    The incident occurred more than 1 week ago. The incident occurred at home. Injury mechanism: Tripped and fell in garage, left knee pain resolved;  Has right knee pain not injured chronic;  Had meniscal tear. The pain is present in the right knee. Pain severity now: very mild pain, most swells. Pertinent negatives include no numbness. She has tried nothing for the symptoms. The treatment provided no relief.   Upper Extremity Issue   This is a chronic problem. The current episode started more than 1 month ago. The problem occurs constantly. The problem has been waxing and waning.  Associated symptoms include arthralgias. Pertinent negatives include no abdominal pain, chest pain, headaches, myalgias, nausea, numbness, vertigo, visual change, vomiting or weakness. Associated symptoms comments: Had soft tissue mass resected mid-upper arm;  Has chronic swelling since then and has hard time raising above head.  Pain mostly over scar.    Cancer was melanoma, has not been in f/u with dermatology in a while;  Has had several spots melanoma with resection.. Nothing aggravates the symptoms. She has tried nothing for the symptoms. The treatment provided no relief.     Reports had cp 1 month ago, but none since;  Was very anxious at time.    Review of Systems   Constitutional: Unexpected weight change: Weight gain or loss.   Respiratory: Negative for shortness of breath and wheezing.    Cardiovascular: Positive for leg swelling. Negative for chest pain and palpitations.   Gastrointestinal: Negative for abdominal pain, nausea and vomiting.   Musculoskeletal: Positive for arthralgias. Negative for myalgias.   Neurological: Positive for dizziness. Negative for vertigo, tremors, syncope, facial asymmetry, speech difficulty, weakness, light-headedness, numbness and headaches.   Psychiatric/Behavioral: The patient is nervous/anxious (still grieving loss ).        Patient Active Problem List   Diagnosis   • Essential hypertension   • Depression with anxiety   • Atypical chest pain   • Adult body mass index greater than 30   • Elevated blood-pressure reading without diagnosis of hypertension   • Adiposity   • Overweight   • Encounter for screening for other disorder       Social History     Socioeconomic History   • Marital status:      Spouse name: Not on file   • Number of children: Not on file   • Years of education: Not on file   • Highest education level: Not on file   Tobacco Use   • Smoking status: Never Smoker   • Smokeless tobacco: Never Used   Substance and Sexual Activity   • Alcohol  use: Yes     Alcohol/week: 4.2 oz     Types: 7 Cans of beer per week   • Drug use: No       No Known Allergies    Current Outpatient Medications on File Prior to Visit   Medication Sig Dispense Refill   • buPROPion XL (WELLBUTRIN XL) 300 MG 24 hr tablet TAKE 1 TABLET IN THE MORNING 30 tablet 6   • fexofenadine (ALLEGRA) 180 MG tablet TAKE (1) TABLET DAILY 30 tablet 6   • hydrochlorothiazide (HYDRODIURIL) 25 MG tablet TAKE (1) TABLET DAILY 30 tablet 6   • montelukast (SINGULAIR) 10 MG tablet TAKE 1 TABLET BY MOUTH NIGHTLY 30 tablet 6   • [DISCONTINUED] albuterol (PROVENTIL HFA;VENTOLIN HFA) 108 (90 Base) MCG/ACT inhaler Inhale 2 puffs Every 6 (Six) Hours As Needed for Wheezing. 6.7 g 11   • [DISCONTINUED] azithromycin (ZITHROMAX) 250 MG tablet Take 2 tablets the first day, then 1 tablet daily for 4 days. 6 tablet 0   • [DISCONTINUED] sulfamethoxazole-trimethoprim (BACTRIM DS,SEPTRA DS) 800-160 MG per tablet Take 1 tablet by mouth 2 (Two) Times a Day. 20 tablet 0     No current facility-administered medications on file prior to visit.        Objective   Vitals:    04/23/19 1401   BP: 126/80   Pulse: 95   Temp: 98.1 °F (36.7 °C)   SpO2: 98%   Weight: 95.3 kg (210 lb)       Physical Exam   Constitutional: She appears well-developed and well-nourished.   HENT:   Head: Normocephalic and atraumatic.   Neck: Neck supple. No JVD present. No thyromegaly present.   Cardiovascular: Normal rate, regular rhythm and normal heart sounds. Exam reveals no gallop and no friction rub.   No murmur heard.  Pulmonary/Chest: Effort normal and breath sounds normal. No respiratory distress. She has no wheezes. She has no rales.   Abdominal: Soft. Bowel sounds are normal. She exhibits no distension. There is no tenderness. There is no rebound and no guarding.   Musculoskeletal: She exhibits edema (very stress swelling).        Right shoulder: She exhibits normal pulse and normal strength.        Right knee: She exhibits effusion. No  tenderness found. No medial joint line, no lateral joint line, no MCL, no LCL and no patellar tendon tenderness noted.        Right upper arm: She exhibits swelling (mild in  comparison to left, very hypersensitive to touch) and deformity.        Right lower leg: She exhibits no tenderness.        Left lower leg: She exhibits no tenderness.   Neurological: She is alert.   Skin: Skin is warm and dry.   Left neck 5 mm pink nodule   Psychiatric: She has a normal mood and affect. Her behavior is normal.   Nursing note and vitals reviewed.      Assessment/Plan   Loren was seen today for dizziness and hypertension.    Diagnoses and all orders for this visit:    Essential hypertension  -     hydrochlorothiazide (HYDRODIURIL) 25 MG tablet; Take 0.5 tablets by mouth Daily.  -     Comprehensive Metabolic Panel    History of melanoma  -     Ambulatory Referral to Dermatology    Chronic pain of right knee    Complex regional pain syndrome type 1 affecting right upper arm    Drug therapy  -     Comprehensive Metabolic Panel  -     Lipid Panel    Other orders  -     albuterol sulfate  (90 Base) MCG/ACT inhaler; Inhale 2 puffs Every 6 (Six) Hours As Needed for Wheezing.    -will try cutting hctz in half to see if less orthostatic symptoms;  D/w swelling and cut Na, avoid salt and elevate legs;  D/w changing to lasix and different bp med but would prefer less meds  -if cp recurs, should seek care immediately  -needs to see dermatologist given hx;  I would recommend derm remove left sided neck lesion rather than here as hx of melanoma though looks benign.  -pain right - ? RSD, will try compound pain crm.         -Follow up: 8 weeks and prn

## 2019-04-24 LAB
ALBUMIN SERPL-MCNC: 4.5 G/DL (ref 3.6–4.8)
ALBUMIN/GLOB SERPL: 1.9 {RATIO} (ref 1.2–2.2)
ALP SERPL-CCNC: 73 IU/L (ref 39–117)
ALT SERPL-CCNC: 21 IU/L (ref 0–32)
AST SERPL-CCNC: 18 IU/L (ref 0–40)
BILIRUB SERPL-MCNC: 0.3 MG/DL (ref 0–1.2)
BUN SERPL-MCNC: 21 MG/DL (ref 8–27)
BUN/CREAT SERPL: 20 (ref 12–28)
CALCIUM SERPL-MCNC: 9.5 MG/DL (ref 8.7–10.3)
CHLORIDE SERPL-SCNC: 101 MMOL/L (ref 96–106)
CHOLEST SERPL-MCNC: 211 MG/DL (ref 100–199)
CO2 SERPL-SCNC: 26 MMOL/L (ref 20–29)
CREAT SERPL-MCNC: 1.05 MG/DL (ref 0.57–1)
GLOBULIN SER CALC-MCNC: 2.4 G/DL (ref 1.5–4.5)
GLUCOSE SERPL-MCNC: 89 MG/DL (ref 65–99)
HDLC SERPL-MCNC: 51 MG/DL
LDLC SERPL CALC-MCNC: 120 MG/DL (ref 0–99)
POTASSIUM SERPL-SCNC: 4.2 MMOL/L (ref 3.5–5.2)
PROT SERPL-MCNC: 6.9 G/DL (ref 6–8.5)
SODIUM SERPL-SCNC: 144 MMOL/L (ref 134–144)
TRIGL SERPL-MCNC: 198 MG/DL (ref 0–149)
VLDLC SERPL CALC-MCNC: 40 MG/DL (ref 5–40)

## 2019-05-21 ENCOUNTER — TELEPHONE (OUTPATIENT)
Dept: FAMILY MEDICINE CLINIC | Facility: CLINIC | Age: 59
End: 2019-05-21

## 2019-05-21 RX ORDER — AZITHROMYCIN 250 MG/1
TABLET, FILM COATED ORAL
Qty: 6 TABLET | Refills: 0 | Status: SHIPPED | OUTPATIENT
Start: 2019-05-21 | End: 2019-06-17

## 2019-05-21 RX ORDER — METHYLPREDNISOLONE 4 MG/1
TABLET ORAL
Qty: 21 EACH | Refills: 0 | Status: SHIPPED | OUTPATIENT
Start: 2019-05-21 | End: 2019-06-17

## 2019-06-17 ENCOUNTER — OFFICE VISIT (OUTPATIENT)
Dept: FAMILY MEDICINE CLINIC | Facility: CLINIC | Age: 59
End: 2019-06-17

## 2019-06-17 VITALS
DIASTOLIC BLOOD PRESSURE: 80 MMHG | HEART RATE: 87 BPM | BODY MASS INDEX: 34.73 KG/M2 | WEIGHT: 212 LBS | RESPIRATION RATE: 16 BRPM | OXYGEN SATURATION: 98 % | SYSTOLIC BLOOD PRESSURE: 116 MMHG

## 2019-06-17 DIAGNOSIS — I10 ESSENTIAL HYPERTENSION: Primary | ICD-10-CM

## 2019-06-17 PROCEDURE — 99213 OFFICE O/P EST LOW 20 MIN: CPT | Performed by: FAMILY MEDICINE

## 2019-06-17 NOTE — PROGRESS NOTES
Regan Nur is a 58 y.o. female. Presents today for   Chief Complaint   Patient presents with   • Hypertension     8 week follow up       Hypertension   This is a chronic problem. The current episode started more than 1 year ago. The problem is unchanged. The problem is controlled. Pertinent negatives include no chest pain, orthopnea, palpitations, peripheral edema, PND or shortness of breath. (Just completely stopped hctz and dizziness resolved;  Bp has remained fine;  Only took once for swelling.) Past treatments include diuretics. Current antihypertension treatment includes diuretics. The current treatment provides moderate improvement. There are no compliance problems.  There is no history of kidney disease, CAD/MI, CVA, heart failure or PVD. There is no history of chronic renal disease.     Grieving loss of , doesn't want any meds for depression.  We talked about challenges.    Review of Systems   Respiratory: Negative for shortness of breath.    Cardiovascular: Negative for chest pain, palpitations, orthopnea and PND.       Patient Active Problem List   Diagnosis   • Essential hypertension   • Depression with anxiety   • Atypical chest pain   • Adult body mass index greater than 30   • Elevated blood-pressure reading without diagnosis of hypertension   • Adiposity   • Overweight   • Encounter for screening for other disorder       Social History     Socioeconomic History   • Marital status:      Spouse name: Not on file   • Number of children: Not on file   • Years of education: Not on file   • Highest education level: Not on file   Tobacco Use   • Smoking status: Never Smoker   • Smokeless tobacco: Never Used   Substance and Sexual Activity   • Alcohol use: Yes     Alcohol/week: 4.2 oz     Types: 7 Cans of beer per week   • Drug use: No       No Known Allergies    Current Outpatient Medications on File Prior to Visit   Medication Sig Dispense Refill   • albuterol sulfate   (90 Base) MCG/ACT inhaler Inhale 2 puffs Every 6 (Six) Hours As Needed for Wheezing. 6.7 g 11   • buPROPion XL (WELLBUTRIN XL) 300 MG 24 hr tablet TAKE 1 TABLET IN THE MORNING 30 tablet 6   • fexofenadine (ALLEGRA) 180 MG tablet TAKE (1) TABLET DAILY 30 tablet 6   • montelukast (SINGULAIR) 10 MG tablet TAKE 1 TABLET BY MOUTH NIGHTLY 30 tablet 6   • [DISCONTINUED] hydrochlorothiazide (HYDRODIURIL) 25 MG tablet Take 0.5 tablets by mouth Daily. 30 tablet 6   • [DISCONTINUED] azithromycin (ZITHROMAX) 250 MG tablet Take 2 tablets the first day, then 1 tablet daily for 4 days. 6 tablet 0   • [DISCONTINUED] methylPREDNISolone (MEDROL, BLAYNE,) 4 MG tablet Take as directed on package instructions. 21 each 0     No current facility-administered medications on file prior to visit.        Objective   Vitals:    06/17/19 1808   BP: 116/80   BP Location: Left arm   Patient Position: Sitting   Cuff Size: Large Adult   Pulse: 87   Resp: 16   SpO2: 98%   Weight: 96.2 kg (212 lb)       Physical Exam   Constitutional: She is oriented to person, place, and time. She appears well-developed and well-nourished.   Neurological: She is alert and oriented to person, place, and time.   Skin: Skin is warm and dry.   Psychiatric: She has a normal mood and affect. Her behavior is normal.   Nursing note and vitals reviewed.      Assessment/Plan   Loren was seen today for hypertension.    Diagnoses and all orders for this visit:    Essential hypertension      BP ok with no meds, will hold off any further and just monitor  Discussed grief at length;         -Follow up: 4 months and prn

## 2019-07-22 ENCOUNTER — OFFICE VISIT (OUTPATIENT)
Dept: FAMILY MEDICINE CLINIC | Facility: CLINIC | Age: 59
End: 2019-07-22

## 2019-07-22 VITALS
TEMPERATURE: 97.9 F | DIASTOLIC BLOOD PRESSURE: 88 MMHG | WEIGHT: 210 LBS | HEIGHT: 65 IN | SYSTOLIC BLOOD PRESSURE: 118 MMHG | BODY MASS INDEX: 34.99 KG/M2 | HEART RATE: 87 BPM | OXYGEN SATURATION: 96 %

## 2019-07-22 DIAGNOSIS — Z82.49 FAMILY HISTORY OF PREMATURE CAD: ICD-10-CM

## 2019-07-22 DIAGNOSIS — R07.89 OTHER CHEST PAIN: Primary | ICD-10-CM

## 2019-07-22 DIAGNOSIS — L27.0 DRUG ERUPTION: ICD-10-CM

## 2019-07-22 DIAGNOSIS — R21 PAPULAR ERUPTION: ICD-10-CM

## 2019-07-22 PROCEDURE — 99214 OFFICE O/P EST MOD 30 MIN: CPT | Performed by: FAMILY MEDICINE

## 2019-07-22 PROCEDURE — 93000 ELECTROCARDIOGRAM COMPLETE: CPT | Performed by: FAMILY MEDICINE

## 2019-07-22 RX ORDER — METHYLPREDNISOLONE 4 MG/1
TABLET ORAL
Qty: 21 TABLET | Refills: 0 | Status: SHIPPED | OUTPATIENT
Start: 2019-07-22 | End: 2020-02-06

## 2019-07-22 RX ORDER — TRIAMCINOLONE ACETONIDE 5 MG/G
CREAM TOPICAL 2 TIMES DAILY
Qty: 60 G | Refills: 1 | Status: SHIPPED | OUTPATIENT
Start: 2019-07-22 | End: 2020-02-24

## 2019-07-22 RX ORDER — NITROGLYCERIN 0.4 MG/1
0.4 TABLET SUBLINGUAL
Qty: 24 TABLET | Refills: 1 | Status: SHIPPED | OUTPATIENT
Start: 2019-07-22

## 2019-07-22 NOTE — PROGRESS NOTES
Regan Nur is a 58 y.o. female. Presents today for   Chief Complaint   Patient presents with   • Rash     bilateral arms, hands - Exposed to 5th disease   • Shortness of Breath   • Chest Pain     feels heavy   • Numbness     left arm       Chest Pain    This is a new problem. Episode onset: 3 to 4 months. The onset quality is sudden. Progression since onset: currently no cp. The pain radiates to the left jaw and left arm. Associated symptoms include exertional chest pressure, malaise/fatigue, PND (not every night, just wakes up and cannot sleep but does feel dysnpea) and shortness of breath (worse with exertion). Pertinent negatives include no lower extremity edema or orthopnea. She has tried nothing for the symptoms. The treatment provided no relief.   Her past medical history is significant for hypertension (Is completely off bp meds now and dizziness now improved;).   Pertinent negatives for past medical history include no CAD, no MI and no PE.   Her family medical history is significant for CAD and early MI (Father  at 55 yoa;  Had Cabg x 4). Prior workup: Had heart cath  early  and last ECHO at that time, reports told [facility];  has not been following with cardiology for several years.     Grandkids had 5th disease, rash pruritic;  Having arms and legs;  No new medications;  Was taking tumeric with kay new dose.  Has not had in 1 week.  HC crm no relief.    Review of Systems   Constitutional: Positive for malaise/fatigue.   Respiratory: Positive for shortness of breath (worse with exertion).    Cardiovascular: Positive for chest pain and PND (not every night, just wakes up and cannot sleep but does feel dysnpea). Negative for orthopnea.       Patient Active Problem List   Diagnosis   • Essential hypertension   • Depression with anxiety   • Atypical chest pain   • Adult body mass index greater than 30   • Elevated blood-pressure reading without diagnosis of hypertension   • Adiposity  "  • Overweight   • Encounter for screening for other disorder       Social History     Socioeconomic History   • Marital status:      Spouse name: Not on file   • Number of children: Not on file   • Years of education: Not on file   • Highest education level: Not on file   Tobacco Use   • Smoking status: Never Smoker   • Smokeless tobacco: Never Used   Substance and Sexual Activity   • Alcohol use: Yes     Alcohol/week: 4.2 oz     Types: 7 Cans of beer per week   • Drug use: No       No Known Allergies    Current Outpatient Medications on File Prior to Visit   Medication Sig Dispense Refill   • albuterol sulfate  (90 Base) MCG/ACT inhaler Inhale 2 puffs Every 6 (Six) Hours As Needed for Wheezing. 6.7 g 11   • buPROPion XL (WELLBUTRIN XL) 300 MG 24 hr tablet TAKE 1 TABLET IN THE MORNING 30 tablet 6   • fexofenadine (ALLEGRA) 180 MG tablet TAKE (1) TABLET DAILY 30 tablet 6   • montelukast (SINGULAIR) 10 MG tablet TAKE 1 TABLET BY MOUTH NIGHTLY 30 tablet 6     No current facility-administered medications on file prior to visit.        Objective   Vitals:    07/22/19 1837   BP: 118/88   BP Location: Right arm   Patient Position: Sitting   Cuff Size: Adult   Pulse: 87   Temp: 97.9 °F (36.6 °C)   TempSrc: Oral   SpO2: 96%   Weight: 95.3 kg (210 lb)   Height: 165.1 cm (65\")         Physical Exam   Constitutional: She appears well-developed and well-nourished.   HENT:   Head: Normocephalic and atraumatic.   Neck: Neck supple. No JVD present. No thyromegaly present.   Cardiovascular: Normal rate, regular rhythm and normal heart sounds. Exam reveals no gallop and no friction rub.   No murmur heard.  Pulmonary/Chest: Effort normal and breath sounds normal. No respiratory distress. She has no wheezes. She has no rales.   Abdominal: Soft. Bowel sounds are normal. She exhibits no distension. There is no tenderness. There is no rebound and no guarding.   Musculoskeletal: She exhibits no edema.   Neurological: She is " alert.   Skin: Skin is warm and dry.   Psychiatric: She has a normal mood and affect. Her behavior is normal.   Nursing note and vitals reviewed.      ECG 12 Lead - cp  Date/Time: 7/22/2019 7:07 PM  Performed by: David Cantu DO  Authorized by: David Cantu DO   Comparison: not compared with previous ECG   Previous ECG: no previous ECG available  Rhythm: sinus rhythm  BPM: 72  Conduction comments: QRS 96 ms  QTc 444 ms  ST Segments: ST segments normal  T inversion: III  QRS axis: normal  Clinical impression comment: 1) NSR 2) Non-specific T wave  III;  no priors to compare            Assessment/Plan   Loren was seen today for rash, shortness of breath, chest pain and numbness.    Diagnoses and all orders for this visit:    Other chest pain  -     ECG 12 Lead - cp  -     Cancel: Ambulatory Referral to Cardiology  -     Ambulatory Referral to Cardiology  -     aspirin 81 MG tablet; Take 1 tablet by mouth Daily.  -     nitroglycerin (NITROSTAT) 0.4 MG SL tablet; Place 1 tablet under the tongue Every 5 (Five) Minutes As Needed for Chest Pain (if uses, go ER immediately). Max 3 doses in 15 minutes.    Papular eruption  -     methylPREDNISolone (MEDROL, BLAYNE,) 4 MG tablet; Take as directed on package instructions.  -     triamcinolone (KENALOG) 0.5 % cream; Apply  topically to the appropriate area as directed 2 (Two) Times a Day.    Drug eruption  -     methylPREDNISolone (MEDROL, BLAYNE,) 4 MG tablet; Take as directed on package instructions.  -     triamcinolone (KENALOG) 0.5 % cream; Apply  topically to the appropriate area as directed 2 (Two) Times a Day.    Family history of premature CAD  -     aspirin 81 MG tablet; Take 1 tablet by mouth Daily.  -     nitroglycerin (NITROSTAT) 0.4 MG SL tablet; Place 1 tablet under the tongue Every 5 (Five) Minutes As Needed for Chest Pain (if uses, go ER immediately). Max 3 doses in 15 minutes.    patient with chest pain with some classic features;  She has  strong fam hx of cad;  We discussed possible her stress and anxiety, but recommend see cardiology for further evaluation.  If chest pain does recur, recommend go to ER immediately.  Start aspirin daily;  Will give nitro to try prn cp, but if takes go ER.  ? Reaction to OTC supplement, recommend continue to hold will give steroid.         -Follow up: after sees cardiology

## 2019-08-02 DIAGNOSIS — F32.A DEPRESSION, UNSPECIFIED DEPRESSION TYPE: ICD-10-CM

## 2019-08-02 RX ORDER — BUPROPION HYDROCHLORIDE 300 MG/1
TABLET ORAL
Qty: 30 TABLET | Refills: 0 | Status: SHIPPED | OUTPATIENT
Start: 2019-08-02 | End: 2019-09-05 | Stop reason: SDUPTHER

## 2019-08-07 ENCOUNTER — TELEPHONE (OUTPATIENT)
Dept: FAMILY MEDICINE CLINIC | Facility: CLINIC | Age: 59
End: 2019-08-07

## 2019-08-09 NOTE — TELEPHONE ENCOUNTER
Would she want to try prozac 10mg daily just for a few months, it might help snap her out of this and would be forever.    RRJ

## 2019-08-09 NOTE — TELEPHONE ENCOUNTER
Per pt, she does not want to try the prozac. She bought a supplement that is called Goodbye Stress and is gong to try that first.

## 2019-09-05 DIAGNOSIS — F32.A DEPRESSION, UNSPECIFIED DEPRESSION TYPE: ICD-10-CM

## 2019-09-05 RX ORDER — BUPROPION HYDROCHLORIDE 300 MG/1
TABLET ORAL
Qty: 30 TABLET | Refills: 0 | Status: SHIPPED | OUTPATIENT
Start: 2019-09-05 | End: 2019-10-11 | Stop reason: SDUPTHER

## 2019-10-11 DIAGNOSIS — F32.A DEPRESSION, UNSPECIFIED DEPRESSION TYPE: ICD-10-CM

## 2019-10-11 RX ORDER — BUPROPION HYDROCHLORIDE 300 MG/1
TABLET ORAL
Qty: 30 TABLET | Refills: 0 | Status: SHIPPED | OUTPATIENT
Start: 2019-10-11 | End: 2019-10-16 | Stop reason: SDUPTHER

## 2019-10-16 ENCOUNTER — TELEPHONE (OUTPATIENT)
Dept: FAMILY MEDICINE CLINIC | Facility: CLINIC | Age: 59
End: 2019-10-16

## 2019-10-16 DIAGNOSIS — F32.A DEPRESSION, UNSPECIFIED DEPRESSION TYPE: ICD-10-CM

## 2019-10-16 RX ORDER — BUPROPION HYDROCHLORIDE 300 MG/1
300 TABLET ORAL EVERY MORNING
Qty: 30 TABLET | Refills: 12 | Status: SHIPPED | OUTPATIENT
Start: 2019-10-16 | End: 2020-11-12

## 2019-10-16 NOTE — TELEPHONE ENCOUNTER
Per pt, she has a rash under both breasts and by her abdominal scar. She said it'll go away but it comes right back. Pt has tried kenalog cream, neosporin, hydrocortisone cream,  gold bond medicated powder and nothing seems to help. She has been washing with Dial soap to wash only to see if that will help too. Please advise.     I sent wellbutrin in with refills..

## 2019-10-16 NOTE — TELEPHONE ENCOUNTER
I have no idea on wellbutrin as seen recently but can send with 12 refills.  For the itching if no rash, start xyzal 5mg po nightly to see if settles.  RRJ

## 2019-10-16 NOTE — TELEPHONE ENCOUNTER
PT SAYS SHE IS WONDERING WHY SHE HAS TO CALL IN HER WELLBUTRIN EVERY MONTH? ALSO SAYS THE SKIN UNDER HER BREAST IS BACK ITCHING AND THE CREAM ONLY HELPS SHORT TERM.

## 2019-10-17 RX ORDER — NYSTATIN 100000 U/G
CREAM TOPICAL 3 TIMES DAILY
Qty: 60 G | Refills: 2 | Status: SHIPPED | OUTPATIENT
Start: 2019-10-17 | End: 2020-02-24

## 2019-10-17 NOTE — TELEPHONE ENCOUNTER
Sounds like yeast yeast infection instead which none of things tried would help.  Send nystatin cream apply tid 60g 2 ref

## 2019-11-22 ENCOUNTER — TELEPHONE (OUTPATIENT)
Dept: FAMILY MEDICINE CLINIC | Facility: CLINIC | Age: 59
End: 2019-11-22

## 2019-11-22 RX ORDER — AMOXICILLIN 500 MG/1
500 CAPSULE ORAL 3 TIMES DAILY
Qty: 30 CAPSULE | Refills: 0 | Status: SHIPPED | OUTPATIENT
Start: 2019-11-22 | End: 2020-02-06 | Stop reason: SDUPTHER

## 2020-01-02 RX ORDER — MONTELUKAST SODIUM 10 MG/1
TABLET ORAL
Qty: 30 TABLET | Refills: 0 | Status: SHIPPED | OUTPATIENT
Start: 2020-01-02 | End: 2020-03-13

## 2020-01-13 ENCOUNTER — TELEPHONE (OUTPATIENT)
Dept: FAMILY MEDICINE CLINIC | Facility: CLINIC | Age: 60
End: 2020-01-13

## 2020-01-13 DIAGNOSIS — C43.9 MALIGNANT MELANOMA, UNSPECIFIED SITE (HCC): Primary | ICD-10-CM

## 2020-01-13 NOTE — TELEPHONE ENCOUNTER
I put the referral in for dermatology but I don't know anything about pre-certs for doctor offices. If pt has not been seen there before and has had melanoma in the past she should go back to previous doctor that treated her for this if they are still in practice since they have a history with her on this.

## 2020-02-04 ENCOUNTER — TELEPHONE (OUTPATIENT)
Dept: FAMILY MEDICINE CLINIC | Facility: CLINIC | Age: 60
End: 2020-02-04

## 2020-02-05 RX ORDER — AMOXICILLIN 500 MG/1
500 CAPSULE ORAL 3 TIMES DAILY
Qty: 30 CAPSULE | Refills: 0 | COMMUNITY
End: 2020-02-06 | Stop reason: SDUPTHER

## 2020-02-06 RX ORDER — AMOXICILLIN 500 MG/1
500 CAPSULE ORAL 3 TIMES DAILY
Qty: 30 CAPSULE | Refills: 0 | Status: SHIPPED | OUTPATIENT
Start: 2020-02-06 | End: 2020-02-24

## 2020-02-24 ENCOUNTER — OFFICE VISIT (OUTPATIENT)
Dept: FAMILY MEDICINE CLINIC | Facility: CLINIC | Age: 60
End: 2020-02-24

## 2020-02-24 VITALS
OXYGEN SATURATION: 96 % | DIASTOLIC BLOOD PRESSURE: 76 MMHG | HEART RATE: 98 BPM | WEIGHT: 207 LBS | HEIGHT: 65 IN | SYSTOLIC BLOOD PRESSURE: 124 MMHG | BODY MASS INDEX: 34.49 KG/M2

## 2020-02-24 DIAGNOSIS — M25.572 ACUTE LEFT ANKLE PAIN: Primary | ICD-10-CM

## 2020-02-24 PROCEDURE — 99213 OFFICE O/P EST LOW 20 MIN: CPT | Performed by: FAMILY MEDICINE

## 2020-03-13 RX ORDER — MONTELUKAST SODIUM 10 MG/1
TABLET ORAL
Qty: 30 TABLET | Refills: 5 | Status: SHIPPED | OUTPATIENT
Start: 2020-03-13 | End: 2020-09-11

## 2020-03-16 ENCOUNTER — TELEPHONE (OUTPATIENT)
Dept: FAMILY MEDICINE CLINIC | Facility: CLINIC | Age: 60
End: 2020-03-16

## 2020-03-16 RX ORDER — AMOXICILLIN 500 MG/1
1000 CAPSULE ORAL 3 TIMES DAILY
Qty: 30 CAPSULE | Refills: 0 | Status: SHIPPED | OUTPATIENT
Start: 2020-03-16 | End: 2021-04-22

## 2020-09-11 RX ORDER — MONTELUKAST SODIUM 10 MG/1
10 TABLET ORAL NIGHTLY
Qty: 30 TABLET | Refills: 0 | Status: SHIPPED | OUTPATIENT
Start: 2020-09-11 | End: 2021-04-22

## 2020-09-21 RX ORDER — ALBUTEROL SULFATE 90 UG/1
AEROSOL, METERED RESPIRATORY (INHALATION)
Qty: 18 G | Refills: 5 | Status: SHIPPED | OUTPATIENT
Start: 2020-09-21 | End: 2021-06-10 | Stop reason: SDUPTHER

## 2020-10-12 RX ORDER — MONTELUKAST SODIUM 10 MG/1
TABLET ORAL
Qty: 30 TABLET | Refills: 0 | OUTPATIENT
Start: 2020-10-12

## 2020-11-11 DIAGNOSIS — F32.A DEPRESSION, UNSPECIFIED DEPRESSION TYPE: ICD-10-CM

## 2020-11-12 RX ORDER — BUPROPION HYDROCHLORIDE 300 MG/1
TABLET ORAL
Qty: 30 TABLET | Refills: 5 | Status: SHIPPED | OUTPATIENT
Start: 2020-11-12 | End: 2021-05-14 | Stop reason: SDUPTHER

## 2020-11-18 ENCOUNTER — TELEPHONE (OUTPATIENT)
Dept: FAMILY MEDICINE CLINIC | Facility: CLINIC | Age: 60
End: 2020-11-18

## 2020-11-18 RX ORDER — DEXTROMETHORPHAN HYDROBROMIDE AND PROMETHAZINE HYDROCHLORIDE 15; 6.25 MG/5ML; MG/5ML
5 SYRUP ORAL 4 TIMES DAILY PRN
Qty: 180 ML | Refills: 0 | Status: SHIPPED | OUTPATIENT
Start: 2020-11-18 | End: 2021-04-22

## 2020-11-18 RX ORDER — AMOXICILLIN 875 MG/1
875 TABLET, COATED ORAL 2 TIMES DAILY
Qty: 20 TABLET | Refills: 0 | Status: SHIPPED | OUTPATIENT
Start: 2020-11-18 | End: 2021-04-22

## 2020-11-18 NOTE — TELEPHONE ENCOUNTER
I sent in promethazine cough syrup and amoxil but almost all patients calling with sinus type symptoms I have seen ended up having covid 19.  It is Important to know to limit spread, recommend go UC for evaluation and testing.  Also be aware late in course of covid 19 (after a week) can become very severe and develop serious lung issues.  If any difficulty breathing, go ER ASAP>      [facility]  [address]   [address]  [address]  [place], [place] [address]  [phone]  Hours  Monday-Friday  8:00 am-8:00 pm Saturday-Sunday   9:00 am-5:00 pm   [facility]  [address]  [address], [place] [id number] [phone]  Hours  Monday-Friday  8:00 am-8:00 pm Saturday-Sunday   9:00 am-5:00 pm     [facility]  [address]  [address]  [place], [place] [address] [phone]  Hours  Monday-Friday  9:00 am-7:00 pm Saturday-Sunday   9:00 am-4:00 pm   [facility]  [address] away  [address] [place], [place] [address] [phone]    HOTLINES: [place] COVID19 hotline: (242) 273-1108        [facility] COVID-19 Helpline: (635) [phone]    Covid19 Guidance for Caring for Yourself at Home   If you have possible or confirmed COVID-19, do these ten things: 1.Stay home from work or school and away from public places. If you must go out, avoid using public transportation, ride-sharing or taxis as much as possible. 2.Monitor your health carefully. If your symptoms get worse, call your doctor immediately. 3.Get rest, drink plenty of liquids like water, sports drinks, juice or tea and take Tylenol to manage a fever. 4.Cover your coughs and sneezes by wearing a face mask, coughing into your elbow or coughing into a tissue and immediately throwing it away. 5.Wash your hands often with soap and water for at least 20 seconds.  When you can't wash your hands, cover your hands with an alcohol-based hand  that contains at least 60% alcohol. 6.As much as possible, stay in one room and away from other people in your home. Use a separate bathroom, if available. If you need to be around other people, wear a face mask. 7.Avoid sharing personal items with other people in your household, like dishes, towels, and bedding. 8.Clean all surfaces that are touched often, like counters, tabletops, and doorknobs. Use household cleaning sprays or wipes according to the label instructions. 9.Postpone all non-essential medical appointments. If you have a critical medical appointment, ask if you can have it over the phone or computer. If you have to go in person, call the doctor's office ahead of time and tell them that you have, or may have, COVID-19. 10. For medical emergencies, call 911 and notify the person that answers the phone that you have, or may have, COVID-19.

## 2020-11-18 NOTE — TELEPHONE ENCOUNTER
Caller: Boom Loren    Relationship: Self    Best call back number: [phone]     What medication are you requesting: ANTIBIOTIC    What are your current symptoms: HEADACHE / EYE PRESSURE / EAR PAIN / HEAD CONGESTION    How long have you been experiencing symptoms: TWO DAYS    Have you had these symptoms before:    [x] Yes  [] No    Have you been treated for these symptoms before:   [x] Yes  [] No    If a prescription is needed, what is your preferred pharmacy and phone number: [facility] [address] - [phone] [facility] [phone] FX

## 2021-03-22 ENCOUNTER — BULK ORDERING (OUTPATIENT)
Dept: CASE MANAGEMENT | Facility: OTHER | Age: 61
End: 2021-03-22

## 2021-03-22 DIAGNOSIS — Z23 IMMUNIZATION DUE: ICD-10-CM

## 2021-04-22 ENCOUNTER — OFFICE VISIT (OUTPATIENT)
Dept: FAMILY MEDICINE CLINIC | Facility: CLINIC | Age: 61
End: 2021-04-22

## 2021-04-22 VITALS
HEIGHT: 65 IN | HEART RATE: 77 BPM | BODY MASS INDEX: 36.15 KG/M2 | DIASTOLIC BLOOD PRESSURE: 70 MMHG | SYSTOLIC BLOOD PRESSURE: 104 MMHG | WEIGHT: 217 LBS | OXYGEN SATURATION: 98 %

## 2021-04-22 DIAGNOSIS — L72.3 INFECTED SEBACEOUS CYST: Primary | ICD-10-CM

## 2021-04-22 DIAGNOSIS — L08.9 INFECTED SEBACEOUS CYST: Primary | ICD-10-CM

## 2021-04-22 PROCEDURE — 99213 OFFICE O/P EST LOW 20 MIN: CPT | Performed by: FAMILY MEDICINE

## 2021-04-22 RX ORDER — DOXYCYCLINE HYCLATE 100 MG/1
100 CAPSULE ORAL 2 TIMES DAILY
Qty: 20 CAPSULE | Refills: 0 | Status: SHIPPED | OUTPATIENT
Start: 2021-04-22 | End: 2021-06-16

## 2021-04-22 NOTE — PROGRESS NOTES
Regan Nur is a 60 y.o. female. Presents today for   Chief Complaint   Patient presents with   • Cyst     back       Cyst  This is a new problem. The current episode started 1 to 4 weeks ago. The problem occurs constantly. The problem has been unchanged. Pertinent negatives include no chills or fever.   2nd time inflammed and infected;      Review of Systems   Constitutional: Negative for chills and fever.       Patient Active Problem List   Diagnosis   • Essential hypertension   • Depression with anxiety   • Atypical chest pain   • Adult body mass index greater than 30   • Elevated blood-pressure reading without diagnosis of hypertension   • Adiposity   • Overweight   • Encounter for screening for other disorder       Social History     Socioeconomic History   • Marital status:      Spouse name: Not on file   • Number of children: Not on file   • Years of education: Not on file   • Highest education level: Not on file   Tobacco Use   • Smoking status: Never Smoker   • Smokeless tobacco: Never Used   Substance and Sexual Activity   • Alcohol use: Yes     Alcohol/week: 7.0 standard drinks     Types: 7 Cans of beer per week   • Drug use: No       No Known Allergies    Current Outpatient Medications on File Prior to Visit   Medication Sig Dispense Refill   • albuterol sulfate  (90 Base) MCG/ACT inhaler INHALE 2 PUFFS EVERY 6 HOURS AS NEEDED FOR WHEEZING 18 g 5   • aspirin 81 MG tablet Take 1 tablet by mouth Daily. 30 tablet 12   • buPROPion XL (WELLBUTRIN XL) 300 MG 24 hr tablet TAKE 1 TABLET IN THE MORNING 30 tablet 5   • nitroglycerin (NITROSTAT) 0.4 MG SL tablet Place 1 tablet under the tongue Every 5 (Five) Minutes As Needed for Chest Pain (if uses, go ER immediately). Max 3 doses in 15 minutes. 24 tablet 1   • [DISCONTINUED] amoxicillin (AMOXIL) 500 MG capsule Take 2 capsules by mouth 3 (Three) Times a Day. 30 capsule 0   • [DISCONTINUED] amoxicillin (AMOXIL) 875 MG tablet Take 1  "tablet by mouth 2 (Two) Times a Day. 20 tablet 0   • [DISCONTINUED] diclofenac (VOLTAREN) 50 MG EC tablet Take 1 tablet by mouth 2 (Two) Times a Day. 30 tablet 0   • [DISCONTINUED] fexofenadine (ALLEGRA) 180 MG tablet TAKE (1) TABLET DAILY 30 tablet 6   • [DISCONTINUED] montelukast (SINGULAIR) 10 MG tablet Take 1 tablet by mouth Every Night. PLEASE CALL THE OFFICE FOR AN APPT 30 tablet 0   • [DISCONTINUED] promethazine-dextromethorphan (PROMETHAZINE-DM) 6.25-15 MG/5ML syrup Take 5 mL by mouth 4 (Four) Times a Day As Needed for Cough. 180 mL 0     No current facility-administered medications on file prior to visit.       Objective   Vitals:    04/22/21 0839   BP: 104/70   Pulse: 77   SpO2: 98%   Weight: 98.4 kg (217 lb)   Height: 165.1 cm (65\")     Body mass index is 36.11 kg/m².    Physical Exam  Vitals and nursing note reviewed.   Constitutional:       Appearance: Normal appearance. She is not toxic-appearing or diaphoretic.   HENT:      Head: Normocephalic and atraumatic.   Musculoskeletal:        Arms:       Cervical back: Neck supple.   Skin:     General: Skin is warm and dry.      Capillary Refill: Capillary refill takes less than 2 seconds.   Neurological:      Mental Status: She is alert.   Psychiatric:         Mood and Affect: Mood normal.         Behavior: Behavior normal.         Assessment/Plan   Diagnoses and all orders for this visit:    1. Infected sebaceous cyst (Primary)  -     doxycycline (VIBRAMYCIN) 100 MG capsule; Take 1 capsule by mouth 2 (Two) Times a Day.  Dispense: 20 capsule; Refill: 0  -     Ambulatory Referral to General Surgery    recommend epsom salt in hot bath  Recommend removal once non-inflamed, prefers [facility]         -Follow up: Prn - RTC if worse or no improvement.    "

## 2021-05-13 DIAGNOSIS — F32.A DEPRESSION, UNSPECIFIED DEPRESSION TYPE: ICD-10-CM

## 2021-05-14 DIAGNOSIS — F32.A DEPRESSION, UNSPECIFIED DEPRESSION TYPE: ICD-10-CM

## 2021-05-14 RX ORDER — BUPROPION HYDROCHLORIDE 300 MG/1
300 TABLET ORAL EVERY MORNING
Qty: 90 TABLET | Refills: 3 | Status: SHIPPED | OUTPATIENT
Start: 2021-05-14 | End: 2022-04-25 | Stop reason: SDUPTHER

## 2021-05-14 RX ORDER — BUPROPION HYDROCHLORIDE 300 MG/1
TABLET ORAL
Qty: 30 TABLET | Refills: 4 | OUTPATIENT
Start: 2021-05-14

## 2021-06-09 NOTE — TELEPHONE ENCOUNTER
I INFORMED PT THAT SHE NEEDED TO BE SEEN, IT IS NOT NORMAL TO HAVE PEELING AROUND. SHE SAID SHE CAN NOT COME IN AND SHE WILL JUST WING IT.    Positioning (Leave Blank If You Do Not Want): The patient was placed in a comfortable position exposing the surgical site.

## 2021-06-10 ENCOUNTER — TELEPHONE (OUTPATIENT)
Dept: FAMILY MEDICINE CLINIC | Facility: CLINIC | Age: 61
End: 2021-06-10

## 2021-06-10 RX ORDER — METHYLPREDNISOLONE 4 MG/1
TABLET ORAL
Qty: 21 TABLET | Refills: 0 | Status: SHIPPED | OUTPATIENT
Start: 2021-06-10 | End: 2021-06-16 | Stop reason: SDUPTHER

## 2021-06-10 RX ORDER — ALBUTEROL SULFATE 90 UG/1
2 AEROSOL, METERED RESPIRATORY (INHALATION) EVERY 6 HOURS PRN
Qty: 18 G | Refills: 5 | Status: SHIPPED | OUTPATIENT
Start: 2021-06-10 | End: 2022-04-25

## 2021-06-10 NOTE — TELEPHONE ENCOUNTER
Patient wants to know if she can have something called in. She has asthma and is having a cough that won't go away it started off as a cold-runny nose. She said it is in her chest now. No SOB, no wheezing, no chest pains. 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 Think they passed it to her.   Still uses   [facility] - [place], [place] - [address] [place] [address] [phone] [facility] [phone]    [address] [place] [id number]

## 2021-06-16 ENCOUNTER — TELEPHONE (OUTPATIENT)
Dept: FAMILY MEDICINE CLINIC | Facility: CLINIC | Age: 61
End: 2021-06-16

## 2021-06-16 RX ORDER — AZITHROMYCIN 250 MG/1
TABLET, FILM COATED ORAL
Qty: 6 TABLET | Refills: 0 | Status: SHIPPED | OUTPATIENT
Start: 2021-06-16

## 2021-06-16 RX ORDER — METHYLPREDNISOLONE 4 MG/1
TABLET ORAL
Qty: 21 TABLET | Refills: 0 | Status: SHIPPED | OUTPATIENT
Start: 2021-06-16

## 2021-06-16 NOTE — TELEPHONE ENCOUNTER
I sent in zpack and medrol pack another roudn to try and ok use albuterol.  Would take 2 puff 3 to 4 times a day as needed.     Please advise.

## 2021-06-16 NOTE — TELEPHONE ENCOUNTER
PATIENT IS CALLING IN TO SAY THAT SHE FINISHED THE MEDRALL DOSE PACK.  SHE IS STILL HAVING A COUGH WHEN SHE TAKES A DEEP BREATH IS CAUSES MORE COUGHING.  SHE FOUND SOME ALBUTERAL AND HAS TAKE 2 BREATHING TREATMENTS.  SHE WOULD LIKE TO KNOW IF THIS IS OK AND IF THERE ARE ANY OTHER SUGGESTIONS SHE CAN DO    PLEASE ADVISE    [phone]

## 2021-07-02 ENCOUNTER — TELEPHONE (OUTPATIENT)
Dept: FAMILY MEDICINE CLINIC | Facility: CLINIC | Age: 61
End: 2021-07-02

## 2021-07-02 NOTE — TELEPHONE ENCOUNTER
Caller: Loren Nur    Relationship to patient: Self    Best call back number:     Chief complaint: COUGHING    Type of visit: SAME DAY    Requested date:     If rescheduling, when is the original appointment:     Additional notes: PATIENT IS CALLING IN STATING THAT SHE HAS A COUGH, I LOOKED FOR SAME DAY APPOINTMENTS AND THERE WAS NOTHING AVAILABLE.  I CONTACTED THE OFFICE([facility])  AND WAS TOLD THAT DR DELEON IS ON VACATION AND DR SALVADOR HAS A SHORT SCHEDULE AND TO PUT A MESSAGE BACK TO SEE IF PATIENT COULD BE WORKED IN.

## 2021-07-02 NOTE — TELEPHONE ENCOUNTER
Patient informed that Dr conte is out of the office and will answer when he is able but may not be today and if she needs treatment today to seek care at . Please advise.

## 2021-07-02 NOTE — TELEPHONE ENCOUNTER
She has taken a zpack, 2 rounds of steroids and has a rescue inhaler. I know that we cannot work her in. Is there anything else she can take or should I send this to [facility]?

## 2021-07-02 NOTE — TELEPHONE ENCOUNTER
Caller: Loren Nur    Relationship: Self    Best call back number: [phone]    What medication are you requesting: SOMETHING TO HELP WITH COUGH     What are your current symptoms: SEVERE COUGH     How long have you been experiencing symptoms: 1 MONTH     Have you had these symptoms before:    [x] Yes  [] No    Have you been treated for these symptoms before:   [x] Yes  [] No    If a prescription is needed, what is your preferred pharmacy and phone number:      [facility] [place], [place] - [address] - [phone]  - [phone]         Additional notes: PATIENT STATED THAT SHE WOULD LIKE TO SEE IF ANYTHING CAN BE CALLED IN FOR HER TO HELP WITH THIS COUGH IF SHE IS NOT ABLE TO BE WORKED IN TODAY. SHE HAS A SEVERE DRY COUGH AND HAS HAD IT FOR A MONTH OR SO. MD DELEON HAD PRESCRIBED HER SOME MEDICATIONS BEFORE BUT THE COUGH HAS CAME BACK DUE TO WEATHER CHANGING.         DELETE AFTER READING TO PATIENT: “Thank you for sharing this information with me. I will send a message to the clinical team. Please allow 48 hours for the clinical staff to follow up on this request.”

## 2021-07-07 RX ORDER — MONTELUKAST SODIUM 10 MG/1
10 TABLET ORAL NIGHTLY
Qty: 30 TABLET | Refills: 5 | Status: SHIPPED | OUTPATIENT
Start: 2021-07-07

## 2021-07-07 NOTE — TELEPHONE ENCOUNTER
PATIENT CONFIRMED SINGULAIR 10 MG     [facility] - [place], [place] - [address] [place] [address] RD. - [phone] PH - [phone]   [phone]    NEEDS ASAP

## 2021-07-07 NOTE — TELEPHONE ENCOUNTER
PATIENT NEEDS REFILL ON:SINGUAR MG?     PATIENT CAN BE REACHED ON: [phone]     PHARMACY [facility] - [facility] [address] - [phone]  - [phone]   [phone]

## 2022-04-12 ENCOUNTER — TELEPHONE (OUTPATIENT)
Dept: FAMILY MEDICINE CLINIC | Facility: CLINIC | Age: 62
End: 2022-04-12

## 2022-04-12 DIAGNOSIS — Z12.31 ENCOUNTER FOR SCREENING MAMMOGRAM FOR HIGH-RISK PATIENT: ICD-10-CM

## 2022-04-12 DIAGNOSIS — N64.4 BREAST PAIN IN FEMALE: Primary | ICD-10-CM

## 2022-04-12 NOTE — TELEPHONE ENCOUNTER
Caller: Boom Loren    Relationship: Self    Best call back number: [phone] (H)    What orders are you requesting (i.e. lab or imaging): MAMMOGRAM OF LEFT BREAST     In what timeframe would the patient need to come in: ASAP    Where will you receive your lab/imaging services:      Additional notes: PATIENT CALLED TO ADVISE THAT IN THE MIDDLE OF THE NIGHT SHE WAS AWAKEN BY ACHINESS IN HER LEFT BREAST. PATIENT STATES THAT SHE HAS HAD CANCER BEFORE AND WANTS TO BE CHECKED OUT ASAP.    PLEASE CONTACT PATIENT TO ADVISE.         THANKS

## 2022-04-25 ENCOUNTER — OFFICE VISIT (OUTPATIENT)
Dept: FAMILY MEDICINE CLINIC | Facility: CLINIC | Age: 62
End: 2022-04-25

## 2022-04-25 VITALS
SYSTOLIC BLOOD PRESSURE: 140 MMHG | DIASTOLIC BLOOD PRESSURE: 62 MMHG | HEART RATE: 78 BPM | BODY MASS INDEX: 36.32 KG/M2 | TEMPERATURE: 98.4 F | RESPIRATION RATE: 20 BRPM | WEIGHT: 218 LBS | OXYGEN SATURATION: 98 % | HEIGHT: 65 IN

## 2022-04-25 DIAGNOSIS — R60.0 BILATERAL LOWER EXTREMITY EDEMA: Primary | ICD-10-CM

## 2022-04-25 DIAGNOSIS — F32.5 MAJOR DEPRESSIVE DISORDER WITH SINGLE EPISODE, IN FULL REMISSION: ICD-10-CM

## 2022-04-25 DIAGNOSIS — E66.09 CLASS 2 OBESITY DUE TO EXCESS CALORIES WITHOUT SERIOUS COMORBIDITY WITH BODY MASS INDEX (BMI) OF 36.0 TO 36.9 IN ADULT: ICD-10-CM

## 2022-04-25 PROCEDURE — 99214 OFFICE O/P EST MOD 30 MIN: CPT | Performed by: FAMILY MEDICINE

## 2022-04-25 RX ORDER — MELATONIN
1000 DAILY
COMMUNITY

## 2022-04-25 RX ORDER — ALBUTEROL SULFATE 90 UG/1
2 AEROSOL, METERED RESPIRATORY (INHALATION) EVERY 4 HOURS PRN
COMMUNITY
End: 2022-05-25 | Stop reason: SDUPTHER

## 2022-04-25 RX ORDER — ASCORBIC ACID 500 MG
500 TABLET ORAL DAILY
COMMUNITY

## 2022-04-25 RX ORDER — BUPROPION HYDROCHLORIDE 300 MG/1
300 TABLET ORAL EVERY MORNING
Qty: 90 TABLET | Refills: 3 | Status: SHIPPED | OUTPATIENT
Start: 2022-04-25

## 2022-04-25 RX ORDER — FUROSEMIDE 40 MG/1
40 TABLET ORAL DAILY PRN
Qty: 30 TABLET | Refills: 2 | Status: SHIPPED | OUTPATIENT
Start: 2022-04-25

## 2022-04-25 RX ORDER — POTASSIUM CHLORIDE 750 MG/1
10 TABLET, FILM COATED, EXTENDED RELEASE ORAL DAILY PRN
Qty: 30 TABLET | Refills: 2 | Status: SHIPPED | OUTPATIENT
Start: 2022-04-25

## 2022-04-25 NOTE — PROGRESS NOTES
Regan Nur is a 61 y.o. female. Presents today for   Chief Complaint   Patient presents with   • Edema   • Shortness of Breath   • Hospital Follow Up Visit     [facility] 04/14/22   • Anxiety   • Med Refill       Shortness of Breath  This is a chronic problem. The current episode started more than 1 year ago. The problem occurs intermittently. The problem has been waxing and waning. Associated symptoms include leg swelling. Pertinent negatives include no abdominal pain, chest pain (none now), orthopnea or PND. She has tried nothing for the symptoms. Improvement on treatment: deconditioned and not new;  leg swelling new.   Leg Swelling  This is a new problem. The current episode started more than 1 month ago. The problem occurs intermittently. The problem has been unchanged. Pertinent negatives include no abdominal pain or chest pain (none now). Treatments tried: elevatoin helps leg swelling.   Was eating a lot of TV dinners, cut thouse out and improved;  Also on high salt diet, did cut back with improvement.  On wellbutrin for depression helps and helps;      Review of Systems   Respiratory: Positive for shortness of breath.    Cardiovascular: Positive for leg swelling. Negative for chest pain (none now), orthopnea and PND.   Gastrointestinal: Negative for abdominal pain.       Patient Active Problem List   Diagnosis   • Essential hypertension   • Depression with anxiety   • Atypical chest pain   • Adult body mass index greater than 30   • Elevated blood-pressure reading without diagnosis of hypertension   • Adiposity   • Overweight   • Encounter for screening for other disorder       Social History     Socioeconomic History   • Marital status:    Tobacco Use   • Smoking status: Never Smoker   • Smokeless tobacco: Never Used   Vaping Use   • Vaping Use: Never used   Substance and Sexual Activity   • Alcohol use: Yes     Alcohol/week: 7.0 standard drinks     Types: 7 Cans of beer per  "week   • Drug use: No   • Sexual activity: Defer       No Known Allergies    Current Outpatient Medications on File Prior to Visit   Medication Sig Dispense Refill   • albuterol sulfate  (90 Base) MCG/ACT inhaler Inhale 2 puffs Every 4 (Four) Hours As Needed for Wheezing.     • ascorbic acid (VITAMIN C) 500 MG tablet Take 500 mg by mouth Daily.     • aspirin 81 MG tablet Take 1 tablet by mouth Daily. 30 tablet 12   • cholecalciferol (VITAMIN D3) 25 MCG (1000 UT) tablet Take 1,000 Units by mouth Daily.     • nitroglycerin (NITROSTAT) 0.4 MG SL tablet Place 1 tablet under the tongue Every 5 (Five) Minutes As Needed for Chest Pain (if uses, go ER immediately). Max 3 doses in 15 minutes. 24 tablet 1   • [DISCONTINUED] albuterol sulfate  (90 Base) MCG/ACT inhaler Inhale 2 puffs Every 6 (Six) Hours As Needed for Wheezing or Shortness of Air. for wheezing 18 g 5   • [DISCONTINUED] buPROPion XL (WELLBUTRIN XL) 300 MG 24 hr tablet Take 1 tablet by mouth Every Morning. 90 tablet 3   • azithromycin (ZITHROMAX) 250 MG tablet Take first 2 tablets together, then 1 every day until finished. 6 tablet 0   • methylPREDNISolone (MEDROL) 4 MG dose pack Take as directed on package instructions. 21 tablet 0   • montelukast (Singulair) 10 MG tablet Take 1 tablet by mouth Every Night. 30 tablet 5   • [DISCONTINUED] Chlorcyclizine-Pseudoephed 25-60 MG tablet 1 po q8h prn cough/congestion 42 tablet 0     No current facility-administered medications on file prior to visit.       Objective   Vitals:    04/25/22 1724   BP: 140/62   BP Location: Left arm   Patient Position: Sitting   Cuff Size: Adult   Pulse: 78   Resp: 20   Temp: 98.4 °F (36.9 °C)   TempSrc: Temporal   SpO2: 98%   Weight: 98.9 kg (218 lb)   Height: 165.1 cm (65\")   PainSc: 0-No pain     Body mass index is 36.28 kg/m².    Physical Exam  Vitals and nursing note reviewed.   Constitutional:       Appearance: She is well-developed.   HENT:      Head: Normocephalic and " atraumatic.   Neck:      Thyroid: No thyromegaly.      Vascular: No hepatojugular reflux or JVD.   Cardiovascular:      Rate and Rhythm: Normal rate and regular rhythm.      Heart sounds: Normal heart sounds. No murmur heard.    No friction rub. No gallop.   Pulmonary:      Effort: Pulmonary effort is normal. No respiratory distress.      Breath sounds: Normal breath sounds. No wheezing or rales.   Abdominal:      General: Bowel sounds are normal. There is no distension.      Palpations: Abdomen is soft.      Tenderness: There is no abdominal tenderness. There is no guarding or rebound.   Musculoskeletal:      Cervical back: Neck supple.      Right lower leg: Edema present.      Left lower leg: Edema present.   Skin:     General: Skin is warm and dry.   Neurological:      Mental Status: She is alert.   Psychiatric:         Behavior: Behavior normal.       TROPONIN I, HIGH SENSITIVE  Specimen:  Blood  Component   Ref Range & Units 12 d ago   TROPONIN I, HIGH SENSITIVE   0 - 14 ng/Liter <3    Resulting Agency [facility]   Specimen Collected: 04/13/22  5:07 PM Last Resulted: 04/13/22  5:46 PM   [facility]  Result Received: 04/25/22  4:59 PM                   Lipase  Specimen:  Blood  Component   Ref Range & Units 12 d ago   Lipase   22 - 51 Units/Liter 39    Resulting Agency [facility]   Specimen Collected: 04/13/22  5:07 PM Last Resulted: 04/13/22  5:37 PM   Received From: [facility]  Result Received: 04/25/22  4:59 PM                  Contains abnormal data Comprehensive metabolic panel  Specimen:  Blood  Component   Ref Range & Units 12 d ago Resulting Agency   Sodium   135 - 145 mMole/Liter 133 Low   [facility]   Potassium   3.5 - 5.1 mmol/L 3.7  [facility]   Chloride   100 - 111 mmol/L 102  [facility]   CO2   22 - 31 mmol/L 22  [facility]   Anion Gap  9  [facility]   Calcium   8.4 - 10.2 mg/dL 8.9  [facility]   Glucose   70 - 110 mg/dL 95  [facility]   BUN   9 - 23 mg/dL  19  [facility]   Creatinine   0.6 - 1.4 mg/dL 0.8  [facility]   BUN/Creatinine Ratio  23.8  [facility]   Albumin   3.5 - 5.0 Gram/dL 4.2  [facility]   Total Protein   6.0 - 8.3 Gram/dL 7.0  [facility]   A/G Ratio   1.10 - 1.90 1.50  [facility]   Alkaline Phosphatase   32 - 92 Units/Liter 61  [facility]   ALT (SGPT)   10 - 40 Units/Liter 21  [facility]   AST   10 - 42 Units/Liter 17  [facility]   Total Bilirubin   0.2 - 2.0 mg/dL 0.5  [facility]   eGFR   >=60.0 mL/Min 87.8  [facility]   EGFR NON-AFR. AMERICAN   >=60.0 mL/Min 72.4  [facility]   Specimen Collected: 04/13/22  5:07 PM Last Resulted: 04/13/22  5:37 PM   Received From: [facility]  Result Received: 04/25/22  4:59 PM                  Contains abnormal data CBC and differential  Specimen:  Blood  Component   Ref Range & Units 12 d ago   WBC   4.0 - 10.5 x10(3)/ul 5.4    RBC   3.90 - 5.30 x10(6)/ul 4.53    HGB   12.0 - 16.0 Gram/dL 13.6    Hematocrit   35.0 - 45.0 % 40.0    MCV   83.0 - 96.0 fL 88.2    MCH   28.0 - 34.0 pg 30.0    MCHC   30.0 - 36.0 Gram/dL 34.0    RDW   11.0 - 15.5 % 14.0    Platelets   140 - 420 x10(3)/ul 203    MPV   6.5 - 11.0 fL 7.4    SLIDE REVIEW  No    NRBC   1 - 5 0 Low     Resulting Agency [facility]   Specimen Collected: 04/13/22  5:07 PM Last Resulted: 04/13/22  5:21 PM   Received From: [facility]  Result Received: 04/25/22  4:59 PM     XR CHEST 1 VW PORTABLE    Anatomical Region Laterality Modality   Chest -- Radiographic Imaging       Narrative    INDICATION:  Left breast pain, and chest pain, weakness in the legs.     TECHNIQUE:  Frontal chest was obtained at 17:10 hours.     COMPARISON:  None Available     FINDINGS:     The cardiomediastinal silhouette is normal in size.  There is no consolidation or   atelectasis in either lung.  There are no pleural effusions.  There is no   pneumothorax.  No acute osseous process.       IMPRESSION:     No acute chest findings.       Signed by  Meaghan Bob MD   ##### Final #####      Assessment/Plan   Diagnoses and all orders for this visit:    1. Bilateral lower extremity edema (Primary)  -     furosemide (Lasix) 40 MG tablet; Take 1 tablet by mouth Daily As Needed (leg swelling).  Dispense: 30 tablet; Refill: 2  -     potassium chloride 10 MEQ CR tablet; Take 1 tablet by mouth Daily As Needed (leg swelling if take furosemide).  Dispense: 30 tablet; Refill: 2    2. Major depressive disorder with single episode, in full remission (HCC)  -     buPROPion XL (WELLBUTRIN XL) 300 MG 24 hr tablet; Take 1 tablet by mouth Every Morning.  Dispense: 90 tablet; Refill: 3    3. Class 2 obesity due to excess calories without serious comorbidity with body mass index (BMI) of 36.0 to 36.9 in adult    leg swelling - has high salt diet, recommend stop salt and low Na as doing;  No jvd; no pnd/orthonea;  Had neg stres test 4 years and declines another though we discussed cannot base negative results 4 years ago and would recommend or echo, prefers not due to costs and time;  Declined admit to hospital.  Directed please rafaela if worsns;  Ok try prn diuretic  refil well butrin, doing well         -Follow up: 3 months and prn

## 2022-05-24 ENCOUNTER — TELEPHONE (OUTPATIENT)
Dept: FAMILY MEDICINE CLINIC | Facility: CLINIC | Age: 62
End: 2022-05-24

## 2022-05-24 NOTE — TELEPHONE ENCOUNTER
"PATIENT WAS CONTACTED BY PHARMACY AND WAS TOLD THAT albuterol sulfate  (90 Base) MCG/ACT inhaler  IS NOT COVERED BY INSURANCE.   WAS TOLD ANOTHER INHALER STARTS WITH \"P\" WILL BE COVERED    [facility] - [facility] [address] [place] [address] [phone] [facility] [phone] FX        "

## 2022-05-25 RX ORDER — ALBUTEROL SULFATE 90 UG/1
2 AEROSOL, METERED RESPIRATORY (INHALATION) EVERY 4 HOURS PRN
Qty: 6.7 G | Refills: 12 | Status: SHIPPED | OUTPATIENT
Start: 2022-05-25

## 2023-01-24 ENCOUNTER — TELEPHONE (OUTPATIENT)
Dept: FAMILY MEDICINE CLINIC | Facility: CLINIC | Age: 63
End: 2023-01-24
Payer: MEDICAID

## 2023-01-24 RX ORDER — AMOXICILLIN 875 MG/1
875 TABLET, COATED ORAL 2 TIMES DAILY
Qty: 20 TABLET | Refills: 0 | Status: SHIPPED | OUTPATIENT
Start: 2023-01-24

## 2023-01-24 NOTE — TELEPHONE ENCOUNTER
Caller: Bethanie Nuryl    Relationship: Self    Best call back number: [phone]    What medication are you requesting: SOMETHING FOR SINUS INFECTION. TWO WEEKS NOW    What are your current symptoms: FACE IS HURTING , CONGESTION     How long have you been experiencing symptoms:2 WEEKS    Have you had these symptoms before:    [x] Yes  [] No    Have you been treated for these symptoms before:   [x] Yes  [] No    If a prescription is needed, what is your preferred pharmacy and phone number: [facility] - [facility] [address] [place] [address] - [phone] [facility] [phone] FX     Additional notes:

## 2023-02-01 ENCOUNTER — TELEPHONE (OUTPATIENT)
Dept: FAMILY MEDICINE CLINIC | Facility: CLINIC | Age: 63
End: 2023-02-01
Payer: MEDICAID

## 2023-02-01 NOTE — TELEPHONE ENCOUNTER
Caller: Boom Loren    Relationship: Self    Best call back number: [phone]    What medication are you requesting: ALTERNATIVE FOR amoxicillin (AMOXIL) 875 MG tablet    What are your current symptoms: HEAD CONGESTION, COUGH, HEADACHE    Have you had these symptoms before:    [x] Yes  [] No    Have you been treated for these symptoms before:   [x] Yes  [] No    If a prescription is needed, what is your preferred pharmacy and phone number: [facility] [place], [place] - [address] [place] [address] [phone] [facility] [phone] FX     Additional notes: PATIENT STATES THE CURRENT MEDICATION ISN'T WORKING AND WAS WONDERING IF THERE ARE ANY OTHER SUGGESTIONS.     PLEASE ADVISE.

## 2023-02-02 RX ORDER — CEFDINIR 300 MG/1
300 CAPSULE ORAL 2 TIMES DAILY
Qty: 20 CAPSULE | Refills: 0 | Status: SHIPPED | OUTPATIENT
Start: 2023-02-02

## 2023-05-02 ENCOUNTER — TELEPHONE (OUTPATIENT)
Dept: FAMILY MEDICINE CLINIC | Facility: CLINIC | Age: 63
End: 2023-05-02
Payer: MEDICAID

## 2023-05-02 RX ORDER — CEFDINIR 300 MG/1
300 CAPSULE ORAL 2 TIMES DAILY
Qty: 20 CAPSULE | Refills: 0 | Status: SHIPPED | OUTPATIENT
Start: 2023-05-02

## 2023-05-02 NOTE — TELEPHONE ENCOUNTER
Patient is coughing, congestion, had a fever on Saturday. But none since then.  Sinus, forehead hurts.     [facility] - [place], [place] - [address] [place] [address] - [phone]  - [phone]    [address] [place] [id number]   Phone: [phone] Fax: [phone]     She's missed 2 days of work. Did self test for Covid on Saturday. Negative.    Ph# [phone]  OK to leave message.

## 2023-05-24 DIAGNOSIS — F32.5 MAJOR DEPRESSIVE DISORDER WITH SINGLE EPISODE, IN FULL REMISSION: ICD-10-CM

## 2023-05-25 RX ORDER — BUPROPION HYDROCHLORIDE 300 MG/1
TABLET ORAL
Qty: 30 TABLET | Refills: 0 | Status: SHIPPED | OUTPATIENT
Start: 2023-05-25

## 2023-06-05 DIAGNOSIS — F32.5 MAJOR DEPRESSIVE DISORDER WITH SINGLE EPISODE, IN FULL REMISSION: ICD-10-CM

## 2023-06-05 RX ORDER — BUPROPION HYDROCHLORIDE 300 MG/1
300 TABLET ORAL EVERY MORNING
Qty: 30 TABLET | Refills: 1 | Status: SHIPPED | OUTPATIENT
Start: 2023-06-05

## 2023-06-05 NOTE — TELEPHONE ENCOUNTER
Caller: Loren Nur    Relationship: Self    Best call back number: [phone]    Requested Prescriptions:   Requested Prescriptions     Pending Prescriptions Disp Refills    buPROPion XL (WELLBUTRIN XL) 300 MG 24 hr tablet 30 tablet 0     Sig: Take 1 tablet by mouth Every Morning.        Pharmacy where request should be sent: [facility] [place], [place] - [address] [place] [address] - [phone]  - [phone] FX     Last office visit with prescribing clinician: 4/25/2022   Last telemedicine visit with prescribing clinician: Visit date not found   Next office visit with prescribing clinician: Visit date not found     Additional details provided by patient: WE BOOKED AN APPOINTMENT FOR 7/3/23, SOONEST WE COULD FIND.  SHE IS OUT OF THIS MEDICATION THOUGH.     Does the patient have less than a 3 day supply:  [x] Yes  [] No    Would you like a call back once the refill request has been completed: [] Yes [x] No    If the office needs to give you a call back, can they leave a voicemail: [] Yes [x] No    Roger Rod Rep   06/05/23 13:01 EDT

## 2023-08-17 ENCOUNTER — TELEPHONE (OUTPATIENT)
Dept: FAMILY MEDICINE CLINIC | Facility: CLINIC | Age: 63
End: 2023-08-17
Payer: MEDICAID

## 2023-08-17 DIAGNOSIS — H10.33 ACUTE BACTERIAL CONJUNCTIVITIS OF BOTH EYES: Primary | ICD-10-CM

## 2023-08-17 DIAGNOSIS — F32.5 MAJOR DEPRESSIVE DISORDER WITH SINGLE EPISODE, IN FULL REMISSION: ICD-10-CM

## 2023-08-17 RX ORDER — TOBRAMYCIN 3 MG/ML
2 SOLUTION/ DROPS OPHTHALMIC EVERY 6 HOURS SCHEDULED
Qty: 5 ML | Refills: 0 | Status: SHIPPED | OUTPATIENT
Start: 2023-08-17

## 2023-08-17 RX ORDER — BUPROPION HYDROCHLORIDE 300 MG/1
300 TABLET ORAL EVERY MORNING
Qty: 90 TABLET | Refills: 1 | Status: SHIPPED | OUTPATIENT
Start: 2023-08-17

## 2023-08-17 NOTE — TELEPHONE ENCOUNTER
I left message for pt to call back to confirm if she has pink eye or just the kids do.     I sent med to pharmacy.    OKAY FOR HUB TO READ

## 2023-08-24 DIAGNOSIS — F32.5 MAJOR DEPRESSIVE DISORDER WITH SINGLE EPISODE, IN FULL REMISSION: ICD-10-CM

## 2023-08-24 RX ORDER — BUPROPION HYDROCHLORIDE 300 MG/1
300 TABLET ORAL EVERY MORNING
Qty: 90 TABLET | Refills: 1 | Status: SHIPPED | OUTPATIENT
Start: 2023-08-24

## 2023-08-24 NOTE — TELEPHONE ENCOUNTER
Caller: Loren Nur    Relationship: Self    Best call back number: [phone]     Requested Prescriptions:   Requested Prescriptions     Pending Prescriptions Disp Refills    buPROPion XL (WELLBUTRIN XL) 300 MG 24 hr tablet 90 tablet 1     Sig: Take 1 tablet by mouth Every Morning.        Pharmacy where request should be sent: [facility] [place], [place] - [address] [place] [address] - [phone]  - [phone] FX     Last office visit with prescribing clinician: 4/25/2022   Last telemedicine visit with prescribing clinician: Visit date not found   Next office visit with prescribing clinician: 11/6/2023     Additional details provided by patient: WILL NEED NEW PRESCRIPTION AND WANTS TO KNOW IF SHE CAN GET A 90 DAYS SUPPLY OF THE MEDICATION     Does the patient have less than a 3 day supply:  [x] Yes  [] No    Would you like a call back once the refill request has been completed: [] Yes [] No    If the office needs to give you a call back, can they leave a voicemail: [] Yes [] No    Roger Guo Rep   08/24/23 09:01 EDT

## 2023-10-06 ENCOUNTER — TELEPHONE (OUTPATIENT)
Dept: FAMILY MEDICINE CLINIC | Facility: CLINIC | Age: 63
End: 2023-10-06

## 2023-10-06 RX ORDER — AMOXICILLIN 875 MG/1
875 TABLET, COATED ORAL 2 TIMES DAILY
Qty: 20 TABLET | Refills: 0 | Status: SHIPPED | OUTPATIENT
Start: 2023-10-06

## 2023-10-06 NOTE — TELEPHONE ENCOUNTER
Caller: Loren Nur    Relationship: Self    Best call back number: [phone]    What medication are you requesting: WHATEVER DR. DELEON RECOMMENDS    What are your current symptoms: SINUS CONGESTION, STUFFY NOSE, THROAT PAIN    How long have you been experiencing symptoms: 3 DAYS    Have you had these symptoms before:    [] Yes  [x] No    Have you been treated for these symptoms before:   [] Yes  [x] No    If a prescription is needed, what is your preferred pharmacy and phone number: [facility] - [place], [place] - [address] [place] [address] - [phone] [facility] [phone] FX

## 2023-10-11 ENCOUNTER — TELEPHONE (OUTPATIENT)
Dept: FAMILY MEDICINE CLINIC | Facility: CLINIC | Age: 63
End: 2023-10-11

## 2023-10-11 DIAGNOSIS — R05.1 ACUTE COUGH: Primary | ICD-10-CM

## 2023-10-11 RX ORDER — BENZONATATE 100 MG/1
200 CAPSULE ORAL 2 TIMES DAILY PRN
Qty: 40 CAPSULE | Refills: 0 | Status: SHIPPED | OUTPATIENT
Start: 2023-10-11 | End: 2023-10-21

## 2023-10-11 NOTE — TELEPHONE ENCOUNTER
Have her take her inhaler.  She is taking an antibiotic until the 16th.  I also sent in Tessalon Perles for her.

## 2023-10-11 NOTE — TELEPHONE ENCOUNTER
"Caller: Loren Nur    Relationship: Self    Best call back number: [phone]     What medication are you requesting: ANY    What are your current symptoms: WHEEZING / \"RATTLING\"    How long have you been experiencing symptoms: 10/07/23    Have you had these symptoms before:    [] Yes  [x] No    Have you been treated for these symptoms before:   [] Yes  [x] No    If a prescription is needed, what is your preferred pharmacy and phone number: [facility] - [facility] [address] [place] [address] [phone] [facility] [phone] FX             "

## 2023-10-12 DIAGNOSIS — J45.909 MILD ASTHMA, UNSPECIFIED WHETHER COMPLICATED, UNSPECIFIED WHETHER PERSISTENT: ICD-10-CM

## 2023-10-12 RX ORDER — ALBUTEROL SULFATE 90 UG/1
2 AEROSOL, METERED RESPIRATORY (INHALATION) EVERY 4 HOURS PRN
Qty: 6.7 G | Refills: 12 | Status: SHIPPED | OUTPATIENT
Start: 2023-10-12

## 2023-10-25 ENCOUNTER — TELEPHONE (OUTPATIENT)
Dept: FAMILY MEDICINE CLINIC | Facility: CLINIC | Age: 63
End: 2023-10-25
Payer: MEDICAID

## 2023-10-25 DIAGNOSIS — R05.3 CHRONIC COUGH: Primary | ICD-10-CM

## 2023-10-25 RX ORDER — CETIRIZINE HYDROCHLORIDE 10 MG/1
10 TABLET ORAL DAILY
Qty: 30 TABLET | Refills: 0 | Status: SHIPPED | OUTPATIENT
Start: 2023-10-25

## 2023-10-25 RX ORDER — METHYLPREDNISOLONE 4 MG/1
TABLET ORAL
Qty: 21 TABLET | Refills: 0 | Status: SHIPPED | OUTPATIENT
Start: 2023-10-25

## 2023-10-25 RX ORDER — BENZONATATE 200 MG/1
200 CAPSULE ORAL 3 TIMES DAILY PRN
Qty: 45 CAPSULE | Refills: 0 | Status: SHIPPED | OUTPATIENT
Start: 2023-10-25

## 2023-10-25 RX ORDER — FAMOTIDINE 40 MG/1
40 TABLET, FILM COATED ORAL NIGHTLY
Qty: 30 TABLET | Refills: 0 | Status: SHIPPED | OUTPATIENT
Start: 2023-10-25

## 2023-10-25 NOTE — TELEPHONE ENCOUNTER
Caller: Loren Nur     Relationship: PATIENT     Best call back number: [phone]*    What is your medical concern? COUGH NO BETTER, PATIENT STATES THAT SHE CANNOT STOP COUGHING. PATIENT REQUEST A CALL BACK.    How long has this issue been going on? 1 MONTH    Is your provider already aware of this issue? YES    Have you been treated for this issue? YES    PHARMACY CONFIRMED:   [id number] - [place], [place] - [address] [place] [address] - [phone] [facility] [phone]  [phone]

## 2023-11-06 ENCOUNTER — OFFICE VISIT (OUTPATIENT)
Dept: FAMILY MEDICINE CLINIC | Facility: CLINIC | Age: 63
End: 2023-11-06
Payer: MEDICARE

## 2023-11-06 ENCOUNTER — PATIENT ROUNDING (BHMG ONLY) (OUTPATIENT)
Dept: FAMILY MEDICINE CLINIC | Facility: CLINIC | Age: 63
End: 2023-11-06

## 2023-11-06 VITALS
HEIGHT: 65 IN | HEART RATE: 101 BPM | BODY MASS INDEX: 34.99 KG/M2 | WEIGHT: 210 LBS | OXYGEN SATURATION: 97 % | DIASTOLIC BLOOD PRESSURE: 82 MMHG | SYSTOLIC BLOOD PRESSURE: 132 MMHG

## 2023-11-06 DIAGNOSIS — Z12.11 ENCOUNTER FOR COLORECTAL CANCER SCREENING: ICD-10-CM

## 2023-11-06 DIAGNOSIS — Z12.12 ENCOUNTER FOR COLORECTAL CANCER SCREENING: ICD-10-CM

## 2023-11-06 DIAGNOSIS — J45.40 MODERATE PERSISTENT ASTHMA WITHOUT COMPLICATION: ICD-10-CM

## 2023-11-06 DIAGNOSIS — Z12.31 ENCOUNTER FOR SCREENING MAMMOGRAM FOR BREAST CANCER: ICD-10-CM

## 2023-11-06 DIAGNOSIS — R05.3 CHRONIC COUGH: ICD-10-CM

## 2023-11-06 DIAGNOSIS — L98.9 SKIN LESION: ICD-10-CM

## 2023-11-06 DIAGNOSIS — Z00.00 MEDICARE ANNUAL WELLNESS VISIT, SUBSEQUENT: Primary | ICD-10-CM

## 2023-11-06 PROCEDURE — 1160F RVW MEDS BY RX/DR IN RCRD: CPT | Performed by: FAMILY MEDICINE

## 2023-11-06 PROCEDURE — G0439 PPPS, SUBSEQ VISIT: HCPCS | Performed by: FAMILY MEDICINE

## 2023-11-06 PROCEDURE — 1159F MED LIST DOCD IN RCRD: CPT | Performed by: FAMILY MEDICINE

## 2023-11-06 PROCEDURE — 99214 OFFICE O/P EST MOD 30 MIN: CPT | Performed by: FAMILY MEDICINE

## 2023-11-06 PROCEDURE — 1170F FXNL STATUS ASSESSED: CPT | Performed by: FAMILY MEDICINE

## 2023-11-06 RX ORDER — MONTELUKAST SODIUM 10 MG/1
10 TABLET ORAL NIGHTLY
Qty: 90 TABLET | Refills: 3 | Status: SHIPPED | OUTPATIENT
Start: 2023-11-06

## 2023-11-06 RX ORDER — BUDESONIDE AND FORMOTEROL FUMARATE DIHYDRATE 160; 4.5 UG/1; UG/1
2 AEROSOL RESPIRATORY (INHALATION)
Qty: 10.2 G | Refills: 12 | Status: SHIPPED | OUTPATIENT
Start: 2023-11-06

## 2023-11-06 NOTE — PROGRESS NOTES
QUICK REFERENCE INFORMATION:  The ABCs of the Annual Wellness Visit    Subsequent Medicare Wellness Visit    HEALTH RISK ASSESSMENT    1960    Recent Hospitalizations:  No hospitalization(s) within the last year..        Current Medical Providers:  Patient Care Team:  David Cantu DO as PCP - General        Smoking Status:  Social History     Tobacco Use   Smoking Status Never   Smokeless Tobacco Never       Alcohol Consumption:  Social History     Substance and Sexual Activity   Alcohol Use Yes    Alcohol/week: 7.0 standard drinks of alcohol    Types: 7 Cans of beer per week       Depression Screen:       2023     2:00 PM   PHQ-2/PHQ-9 Depression Screening   Little Interest or Pleasure in Doing Things 0-->not at all   Feeling Down, Depressed or Hopeless 0-->not at all   PHQ-9: Brief Depression Severity Measure Score 0       Health Habits and Functional and Cognitive Screenin/6/2023     2:00 PM   Functional & Cognitive Status   Do you have difficulty preparing food and eating? No   Do you have difficulty bathing yourself, getting dressed or grooming yourself? No   Do you have difficulty using the toilet? No   Do you have difficulty moving around from place to place? No   Do you have trouble with steps or getting out of a bed or a chair? No   Current Diet Well Balanced Diet   Dental Exam Up to date   Eye Exam Up to date   Exercise (times per week) 3 times per week   Current Exercises Include House Cleaning   Do you need help using the phone?  No   Are you deaf or do you have serious difficulty hearing?  No   Do you need help to go to places out of walking distance? No   Do you need help shopping? No   Do you need help preparing meals?  No   Do you need help with housework?  No   Do you need help with laundry? No   Do you need help taking your medications? No   Do you need help managing money? No   Do you ever drive or ride in a car without wearing a seat belt? No   Have you felt unusual  stress, anger or loneliness in the last month? No   Who do you live with? Alone   If you need help, do you have trouble finding someone available to you? No   Have you been bothered in the last four weeks by sexual problems? No   Do you have difficulty concentrating, remembering or making decisions? No           Does the patient have evidence of cognitive impairment? No    Aspirin use counseling: Does not need ASA (and currently is not on it)      Recent Lab Results:  CMP:  Lab Results   Component Value Date    BUN 21 04/23/2019    CREATININE 1.05 (H) 04/23/2019    EGFRIFNONA 58 (L) 04/23/2019    EGFRIFAFRI 67 04/23/2019    BCR 20 04/23/2019     04/23/2019    K 4.2 04/23/2019    CO2 26 04/23/2019    CALCIUM 9.5 04/23/2019    PROTENTOTREF 6.9 04/23/2019    ALBUMIN 4.5 04/23/2019    LABGLOBREF 2.4 04/23/2019    LABIL2 1.9 04/23/2019    BILITOT 0.3 04/23/2019    ALKPHOS 73 04/23/2019    AST 18 04/23/2019    ALT 21 04/23/2019     Lipid Panel:  Lab Results   Component Value Date    TRIG 198 (H) 04/23/2019    HDL 51 04/23/2019    VLDL 40 04/23/2019     HbA1c:       Visual Acuity:  No results found.    Age-appropriate Screening Schedule:  Refer to the list below for future screening recommendations based on patient's age, sex and/or medical conditions. Orders for these recommended tests are listed in the plan section. The patient has been provided with a written plan.    Health Maintenance   Topic Date Due    DXA SCAN  Never done    Pneumococcal Vaccine 65+ (1 - PCV) Never done    COLORECTAL CANCER SCREENING  09/12/2017    COVID-19 Vaccine (5 - 2023-24 season) 09/01/2023    MAMMOGRAM  12/31/2023 (Originally 9/12/2018)    INFLUENZA VACCINE  03/31/2024 (Originally 8/1/2023)    PAP SMEAR  01/20/2026 (Originally 9/12/2019)    BMI FOLLOWUP  07/03/2024    ANNUAL WELLNESS VISIT  11/06/2024    TDAP/TD VACCINES (2 - Td or Tdap) 09/12/2026    HEPATITIS C SCREENING  Addressed        Subjective   History of Present  Radha Nur is a 63 y.o. female who presents for an Subsequent Wellness Visit.    The following portions of the patient's history were reviewed and updated as appropriate: allergies, current medications, past family history, past medical history, past social history, past surgical history, and problem list.    Outpatient Medications Prior to Visit   Medication Sig Dispense Refill    albuterol sulfate HFA (ProAir HFA) 108 (90 Base) MCG/ACT inhaler Inhale 2 puffs Every 4 (Four) Hours As Needed for Wheezing or Shortness of Air. proair 6.7 g 12    ascorbic acid (VITAMIN C) 500 MG tablet Take 1 tablet by mouth Daily.      buPROPion XL (WELLBUTRIN XL) 300 MG 24 hr tablet Take 1 tablet by mouth Every Morning. 90 tablet 1    cetirizine (zyrTEC) 10 MG tablet Take 1 tablet by mouth Daily. 30 tablet 0    cholecalciferol (VITAMIN D3) 25 MCG (1000 UT) tablet Take 1 tablet by mouth Daily.      famotidine (PEPCID) 40 MG tablet Take 1 tablet by mouth Every Night. 30 tablet 0    furosemide (Lasix) 40 MG tablet Take 1 tablet by mouth Daily As Needed (leg swelling). 30 tablet 2    potassium chloride 10 MEQ CR tablet Take 1 tablet by mouth Daily As Needed (leg swelling if take furosemide). 90 tablet 1    amoxicillin (AMOXIL) 875 MG tablet Take 1 tablet by mouth 2 (Two) Times a Day. (Patient not taking: Reported on 11/6/2023) 20 tablet 0    benzonatate (TESSALON) 200 MG capsule Take 1 capsule by mouth 3 (Three) Times a Day As Needed for Cough. (Patient not taking: Reported on 11/6/2023) 45 capsule 0    Chlorcyclizine-Pseudoephed 25-60 MG tablet 1 po tid prn congestion (Patient not taking: Reported on 11/6/2023) 42 tablet 0    methylPREDNISolone (MEDROL) 4 MG dose pack Take as directed on package instructions. (Patient not taking: Reported on 11/6/2023) 21 tablet 0    nitroglycerin (NITROSTAT) 0.4 MG SL tablet Place 1 tablet under the tongue Every 5 (Five) Minutes As Needed for Chest Pain (if uses, go ER immediately). Max 3  doses in 15 minutes. (Patient not taking: Reported on 11/6/2023) 24 tablet 1    tobramycin (Tobrex) 0.3 % solution ophthalmic solution Administer 2 drops to both eyes Every 6 (Six) Hours. (Patient not taking: Reported on 11/6/2023) 5 mL 0     No facility-administered medications prior to visit.       Patient Active Problem List   Diagnosis    Depression with anxiety    Atypical chest pain    Adult body mass index greater than 30    Elevated blood-pressure reading without diagnosis of hypertension    Adiposity    Overweight    Encounter for screening for other disorder       Advance Care Planning:  ACP discussion was held with the patient during this visit. Patient does not have an advance directive, information provided.    Identification of Risk Factors:  Risk factors include: Advance Directive Discussion.    Review of Systems   Respiratory:  Positive for cough, shortness of breath and wheezing.        Compared to one year ago, the patient feels her physical health is the same.  Compared to one year ago, the patient feels her mental health is the same.    Objective     Physical Exam  Vitals and nursing note reviewed.   Constitutional:       Appearance: She is well-developed.   HENT:      Head: Normocephalic and atraumatic.   Neck:      Thyroid: No thyromegaly.      Vascular: No JVD.   Cardiovascular:      Rate and Rhythm: Normal rate and regular rhythm.      Heart sounds: Normal heart sounds. No murmur heard.     No friction rub. No gallop.   Pulmonary:      Effort: Pulmonary effort is normal. No respiratory distress.      Breath sounds: Decreased air movement present. Decreased breath sounds present. No wheezing or rales.   Abdominal:      General: Bowel sounds are normal. There is no distension.      Palpations: Abdomen is soft.      Tenderness: There is no abdominal tenderness. There is no guarding or rebound.   Musculoskeletal:      Cervical back: Neck supple.   Skin:     General: Skin is warm and dry.  "  Neurological:      Mental Status: She is alert.   Psychiatric:         Behavior: Behavior normal.         Vitals:    11/06/23 1444   BP: 132/82   Pulse: 101   SpO2: 97%   Weight: 95.3 kg (210 lb)   Height: 165.1 cm (65\")   PainSc: 0-No pain     Body mass index is 34.95 kg/m².           Assessment & Plan   Patient Self-Management and Personalized Health Advice  The patient has been provided with information about: diet and exercise and preventive services including:   Annual Wellness Visit (AWV).    Visit Diagnoses:    ICD-10-CM ICD-9-CM   1. Medicare annual wellness visit, subsequent  Z00.00 V70.0   2. Moderate persistent asthma without complication  J45.40 493.90   3. Chronic cough  R05.3 786.2   4. Encounter for screening mammogram for breast cancer  Z12.31 V76.12   5. Encounter for colorectal cancer screening  Z12.11 V76.51    Z12.12 V76.41   6. Skin lesion  L98.9 709.9       Orders Placed This Encounter   Procedures    Mammo Screening Digital Tomosynthesis Bilateral With CAD     Standing Status:   Future     Standing Expiration Date:   11/6/2024     Order Specific Question:   Reason for Exam:     Answer:   breast cancer screening     Order Specific Question:   Release to patient     Answer:   Routine Release [[id number]]    Cologuard - Stool, Per Rectum     Standing Status:   Future     Standing Expiration Date:   11/6/2024     Order Specific Question:   Release to patient     Answer:   Routine Release [[id number]]    Ambulatory Referral to Dermatology     Referral Priority:   Routine     Referral Type:   Consultation     Referral Reason:   Specialty Services Required     Requested Specialty:   Dermatology     Number of Visits Requested:   1       Outpatient Encounter Medications as of 11/6/2023   Medication Sig Dispense Refill    albuterol sulfate HFA (ProAir HFA) 108 (90 Base) MCG/ACT inhaler Inhale 2 puffs Every 4 (Four) Hours As Needed for Wheezing or Shortness of Air. proair 6.7 g 12    ascorbic acid " (VITAMIN C) 500 MG tablet Take 1 tablet by mouth Daily.      buPROPion XL (WELLBUTRIN XL) 300 MG 24 hr tablet Take 1 tablet by mouth Every Morning. 90 tablet 1    cetirizine (zyrTEC) 10 MG tablet Take 1 tablet by mouth Daily. 30 tablet 0    cholecalciferol (VITAMIN D3) 25 MCG (1000 UT) tablet Take 1 tablet by mouth Daily.      famotidine (PEPCID) 40 MG tablet Take 1 tablet by mouth Every Night. 30 tablet 0    furosemide (Lasix) 40 MG tablet Take 1 tablet by mouth Daily As Needed (leg swelling). 30 tablet 2    potassium chloride 10 MEQ CR tablet Take 1 tablet by mouth Daily As Needed (leg swelling if take furosemide). 90 tablet 1    budesonide-formoterol (SYMBICORT) 160-4.5 MCG/ACT inhaler Inhale 2 puffs 2 (Two) Times a Day. Rinse mouth after use 10.2 g 12    montelukast (SINGULAIR) 10 MG tablet Take 1 tablet by mouth Every Night. 90 tablet 3    [DISCONTINUED] amoxicillin (AMOXIL) 875 MG tablet Take 1 tablet by mouth 2 (Two) Times a Day. (Patient not taking: Reported on 11/6/2023) 20 tablet 0    [DISCONTINUED] benzonatate (TESSALON) 200 MG capsule Take 1 capsule by mouth 3 (Three) Times a Day As Needed for Cough. (Patient not taking: Reported on 11/6/2023) 45 capsule 0    [DISCONTINUED] Chlorcyclizine-Pseudoephed 25-60 MG tablet 1 po tid prn congestion (Patient not taking: Reported on 11/6/2023) 42 tablet 0    [DISCONTINUED] methylPREDNISolone (MEDROL) 4 MG dose pack Take as directed on package instructions. (Patient not taking: Reported on 11/6/2023) 21 tablet 0    [DISCONTINUED] nitroglycerin (NITROSTAT) 0.4 MG SL tablet Place 1 tablet under the tongue Every 5 (Five) Minutes As Needed for Chest Pain (if uses, go ER immediately). Max 3 doses in 15 minutes. (Patient not taking: Reported on 11/6/2023) 24 tablet 1    [DISCONTINUED] tobramycin (Tobrex) 0.3 % solution ophthalmic solution Administer 2 drops to both eyes Every 6 (Six) Hours. (Patient not taking: Reported on 11/6/2023) 5 mL 0     No facility-administered  "encounter medications on file as of 11/6/2023.       Reviewed use of high risk medication in the elderly: yes  Reviewed for potential of harmful drug interactions in the elderly: yes    Follow Up:  No follow-ups on file.     An After Visit Summary and PPPS with all of these plans were given to the patient.           ++++++++++++++++++++++++++++++++++++++++++++++++++++++++++++++++++     Chief Complaint   Patient presents with    Medicare Wellness-subsequent    Cough     Cough  This is a chronic problem. The current episode started more than 1 month ago. The problem has been unchanged. The problem occurs constantly. The cough is Non-productive. Associated symptoms include shortness of breath and wheezing. She has tried a beta-agonist inhaler for the symptoms. The treatment provided moderate relief. Her past medical history is significant for asthma.     Doesn't want steroids as intolerant as irritable.  Review of Systems   Respiratory:  Positive for cough, shortness of breath and wheezing.        Social History     Tobacco Use    Smoking status: Never    Smokeless tobacco: Never   Substance Use Topics    Alcohol use: Yes     Alcohol/week: 7.0 standard drinks of alcohol     Types: 7 Cans of beer per week     O:   Vitals:    11/06/23 1444   BP: 132/82   Pulse: 101   SpO2: 97%   Weight: 95.3 kg (210 lb)   Height: 165.1 cm (65\")   PainSc: 0-No pain     Body mass index is 34.95 kg/m².  Vitals and nursing note reviewed.   Constitutional:       Appearance: Well-developed.   HENT:      Head: Normocephalic and atraumatic.   Neck:      Thyroid: No thyromegaly.      Vascular: No JVD.   Pulmonary:      Effort: Pulmonary effort is normal. No respiratory distress.      Breath sounds: Decreased air movement present. Decreased breath sounds present. No wheezing. No rales.   Cardiovascular:      Normal rate. Regular rhythm. Normal heart sounds.      No gallop.  No friction rub.   Abdominal:      General: Bowel sounds are normal. There " is no distension.      Palpations: Abdomen is soft.      Tenderness: There is no abdominal tenderness. There is no guarding or rebound.   Musculoskeletal:      Cervical back: Neck supple. Skin:     General: Skin is warm and dry.   Neurological:      Mental Status: Alert.   Psychiatric:         Behavior: Behavior normal.       Component  Ref Range & Units 1 yr ago   WBC  4.0 - 10.5 x10(3)/ul 5.4   RBC  3.90 - 5.30 x10(6)/ul 4.53   Hemoglobin  12.0 - 16.0 Gram/dL 13.6   Hematocrit  35.0 - 45.0 % 40.0   MCV  83.0 - 96.0 fL 88.2   MCH  28.0 - 34.0 pg 30.0   MCHC  30.0 - 36.0 Gram/dL 34.0   RDW  11.0 - 15.5 % 14.0   Platelets  140 - 420 x10(3)/ul 203   MPV  6.5 - 11.0 fL 7.4   Scan Slide No   nRBC  1 - 5 0 Low      Specimen Collected: 04/13/22 17:07 Last Resulted: 04/13/22 17:21   Received From: [facility]  Result Received: 04/25/22 16:59    Received Information   Contains abnormal data COMPREHENSIVE METABOLIC PANEL  Order: [id number]   suggestion  Result Information displayed in this report will not trend and may not trigger automated decision support.     Component  Ref Range & Units 1 yr ago   Sodium  135 - 145 mMole/Liter 133 Low    Potassium  3.5 - 5.1 mmol/L 3.7   Chloride  100 - 111 mmol/L 102   CO2  22 - 31 mmol/L 22   Anion Gap 9   Calcium  8.4 - 10.2 mg/dL 8.9   Glucose  70 - 110 mg/dL 95   BUN  9 - 23 mg/dL 19   Creatinine  0.6 - 1.4 mg/dL 0.8   BUN/Creatinine Ratio 23.8   Albumin  3.5 - 5.0 Gram/dL 4.2   Total Protein  6.0 - 8.3 Gram/dL 7.0   A/G Ratio  1.10 - 1.90 1.50   Alkaline Phosphatase  32 - 92 Units/Liter 61   ALT (SGPT)  10 - 40 Units/Liter 21   AST  10 - 42 Units/Liter 17   Total Bilirubin  0.2 - 2.0 mg/dL 0.5   eGFR  >=60.0 mL/Min 87.8   EGFR NON-AFR. AMERICAN  >=60.0 mL/Min 72.4     Specimen Collected: 04/13/22 17:07 Last Resulted: 04/13/22 17:37     Diagnoses and all orders for this visit:    1. Medicare annual wellness visit, subsequent (Primary)    2. Moderate persistent asthma without  complication  -     budesonide-formoterol (SYMBICORT) 160-4.5 MCG/ACT inhaler; Inhale 2 puffs 2 (Two) Times a Day. Rinse mouth after use  Dispense: 10.2 g; Refill: 12  -     montelukast (SINGULAIR) 10 MG tablet; Take 1 tablet by mouth Every Night.  Dispense: 90 tablet; Refill: 3    3. Chronic cough  -     budesonide-formoterol (SYMBICORT) 160-4.5 MCG/ACT inhaler; Inhale 2 puffs 2 (Two) Times a Day. Rinse mouth after use  Dispense: 10.2 g; Refill: 12  -     montelukast (SINGULAIR) 10 MG tablet; Take 1 tablet by mouth Every Night.  Dispense: 90 tablet; Refill: 3    4. Encounter for screening mammogram for breast cancer  -     Mammo Screening Digital Tomosynthesis Bilateral With CAD; Future    5. Encounter for colorectal cancer screening  -     Cologuard - Stool, Per Rectum; Future    6. Skin lesion  -     Ambulatory Referral to Dermatology      Will add ics/labal for asthma as needing albuterol frequently;   add montelukast as allergie as well;  contineu H1 blocker    Has medicare now and would like see dermatology if takes medicare;    Due mammo and colon cancer screening    No follow-ups on file.

## 2023-11-06 NOTE — PROGRESS NOTES
A [email or web address] message has been sent to the patient for PATIENT ROUNDING with [facility].

## 2023-11-06 NOTE — PATIENT INSTRUCTIONS
Advance Care Planning and Advance Directives     You make decisions on a daily basis - decisions about where you want to live, your career, your home, your life. Perhaps one of the most important decisions you face is your choice for future medical care. Take time to talk with your family and your healthcare team and start planning today.  Advance Care Planning is a process that can help you:  Understand possible future healthcare decisions in light of your own experiences  Reflect on those decision in light of your goals and values  Discuss your decisions with those closest to you and the healthcare professionals that care for you  Make a plan by creating a document that reflects your wishes    Surrogate Decision Maker  In the event of a medical emergency, which has left you unable to communicate or to make your own decisions, you would need someone to make decisions for you.  It is important to discuss your preferences for medical treatment with this person while you are in good health.     Qualities of a surrogate decision maker:  Willing to take on this role and responsibility  Knows what you want for future medical care  Willing to follow your wishes even if they don't agree with them  Able to make difficult medical decisions under stressful circumstances    Advance Directives  These are legal documents you can create that will guide your healthcare team and decision maker(s) when needed. These documents can be stored in the electronic medical record.    Living Will - a legal document to guide your care if you have a terminal condition or a serious illness and are unable to communicate. States vary by statute in document names/types, but most forms may include one or more of the following:        -  Directions regarding life-prolonging treatments        -  Directions regarding artificially provided nutrition/hydration        -  Choosing a healthcare decision maker        -  Direction regarding organ/tissue  donation    Durable Power of  for Healthcare - this document names an -in-fact to make medical decisions for you, but it may also allow this person to make personal and financial decisions for you. Please seek the advice of an  if you need this type of document.    **Advance Directives are not required and no one may discriminate against you if you do not sign one.    Medical Orders  Many states allow specific forms/orders signed by your physician to record your wishes for medical treatment in your current state of health. This form, signed in personal communication with your physician, addresses resuscitation and other medical interventions that you may or may not want.      For more information or to schedule a time with a [facility] Advance Care Planning Facilitator contact: [facility]/ACP or call [phone] and someone will contact you directly.Calorie Counting for Weight Loss  Calories are units of energy. Your body needs a certain number of calories from food to keep going throughout the day. When you eat or drink more calories than your body needs, your body stores the extra calories mostly as fat. When you eat or drink fewer calories than your body needs, your body burns fat to get the energy it needs.  Calorie counting means keeping track of how many calories you eat and drink each day. Calorie counting can be helpful if you need to lose weight. If you eat fewer calories than your body needs, you should lose weight. Ask your health care provider what a healthy weight is for you.  For calorie counting to work, you will need to eat the right number of calories each day to lose a healthy amount of weight per week. A dietitian can help you figure out how many calories you need in a day and will suggest ways to reach your calorie goal.  A healthy amount of weight to lose each week is usually 1-2 lb (0.5-0.9 kg). This usually means that your daily calorie intake should be  reduced by 500-750 calories.  Eating 1,200-1,500 calories a day can help most women lose weight.  Eating 1,500-1,800 calories a day can help most men lose weight.  What do I need to know about calorie counting?  Work with your health care provider or dietitian to determine how many calories you should get each day. To meet your daily calorie goal, you will need to:  Find out how many calories are in each food that you would like to eat. Try to do this before you eat.  Decide how much of the food you plan to eat.  Keep a food log. Do this by writing down what you ate and how many calories it had.  To successfully lose weight, it is important to balance calorie counting with a healthy lifestyle that includes regular activity.  Where do I find calorie information?  The number of calories in a food can be found on a Nutrition Facts label. If a food does not have a Nutrition Facts label, try to look up the calories online or ask your dietitian for help.  Remember that calories are listed per serving. If you choose to have more than one serving of a food, you will have to multiply the calories per serving by the number of servings you plan to eat. For example, the label on a package of bread might say that a serving size is 1 slice and that there are 90 calories in a serving. If you eat 1 slice, you will have eaten 90 calories. If you eat 2 slices, you will have eaten 180 calories.  How do I keep a food log?  After each time that you eat, record the following in your food log as soon as possible:  What you ate. Be sure to include toppings, sauces, and other extras on the food.  How much you ate. This can be measured in cups, ounces, or number of items.  How many calories were in each food and drink.  The total number of calories in the food you ate.  Keep your food log near you, such as in a pocket-sized notebook or on an renée or website on your mobile phone. Some programs will calculate calories for you and show you how  many calories you have left to meet your daily goal.  What are some portion-control tips?  Know how many calories are in a serving. This will help you know how many servings you can have of a certain food.  Use a measuring cup to measure serving sizes. You could also try weighing out portions on a kitchen scale. With time, you will be able to estimate serving sizes for some foods.  Take time to put servings of different foods on your favorite plates or in your favorite bowls and cups so you know what a serving looks like.  Try not to eat straight from a food's packaging, such as from a bag or box. Eating straight from the package makes it hard to see how much you are eating and can lead to overeating. Put the amount you would like to eat in a cup or on a plate to make sure you are eating the right portion.  Use smaller plates, glasses, and bowls for smaller portions and to prevent overeating.  Try not to multitask. For example, avoid watching TV or using your computer while eating. If it is time to eat, sit down at a table and enjoy your food. This will help you recognize when you are full. It will also help you be more mindful of what and how much you are eating.  What are tips for following this plan?  Reading food labels  Check the calorie count compared with the serving size. The serving size may be smaller than what you are used to eating.  Check the source of the calories. Try to choose foods that are high in protein, fiber, and vitamins, and low in saturated fat, trans fat, and sodium.  Shopping  Read nutrition labels while you shop. This will help you make healthy decisions about which foods to buy.  Pay attention to nutrition labels for low-fat or fat-free foods. These foods sometimes have the same number of calories or more calories than the full-fat versions. They also often have added sugar, starch, or salt to make up for flavor that was removed with the fat.  Make a grocery list of lower-calorie foods  and stick to it.  Cooking  Try to cook your favorite foods in a healthier way. For example, try baking instead of frying.  Use low-fat dairy products.  Meal planning  Use more fruits and vegetables. One-half of your plate should be fruits and vegetables.  Include lean proteins, such as chicken, turkey, and fish.  Lifestyle  Each week, aim to do one of the followin minutes of moderate exercise, such as walking.  75 minutes of vigorous exercise, such as running.  General information  Know how many calories are in the foods you eat most often. This will help you calculate calorie counts faster.  Find a way of tracking calories that works for you. Get creative. Try different apps or programs if writing down calories does not work for you.  What foods should I eat?    Eat nutritious foods. It is better to have a nutritious, high-calorie food, such as an avocado, than a food with few nutrients, such as a bag of potato chips.  Use your calories on foods and drinks that will fill you up and will not leave you hungry soon after eating.  Examples of foods that fill you up are nuts and nut butters, vegetables, lean proteins, and high-fiber foods such as whole grains. High-fiber foods are foods with more than 5 g of fiber per serving.  Pay attention to calories in drinks. Low-calorie drinks include water and unsweetened drinks.  The items listed above may not be a complete list of foods and beverages you can eat. Contact a dietitian for more information.  What foods should I limit?  Limit foods or drinks that are not good sources of vitamins, minerals, or protein or that are high in unhealthy fats. These include:  Candy.  Other sweets.  Sodas, specialty coffee drinks, alcohol, and juice.  The items listed above may not be a complete list of foods and beverages you should avoid. Contact a dietitian for more information.  How do I count calories when eating out?  Pay attention to portions. Often, portions are much larger  when eating out. Try these tips to keep portions smaller:  Consider sharing a meal instead of getting your own.  If you get your own meal, eat only half of it. Before you start eating, ask for a container and put half of your meal into it.  When available, consider ordering smaller portions from the menu instead of full portions.  Pay attention to your food and drink choices. Knowing the way food is cooked and what is included with the meal can help you eat fewer calories.  If calories are listed on the menu, choose the lower-calorie options.  Choose dishes that include vegetables, fruits, whole grains, low-fat dairy products, and lean proteins.  Choose items that are boiled, broiled, grilled, or steamed. Avoid items that are buttered, battered, fried, or served with cream sauce. Items labeled as crispy are usually fried, unless stated otherwise.  Choose water, low-fat milk, unsweetened iced tea, or other drinks without added sugar. If you want an alcoholic beverage, choose a lower-calorie option, such as a glass of wine or light beer.  Ask for dressings, sauces, and syrups on the side. These are usually high in calories, so you should limit the amount you eat.  If you want a salad, choose a garden salad and ask for grilled meats. Avoid extra toppings such as keith, cheese, or fried items. Ask for the dressing on the side, or ask for olive oil and vinegar or lemon to use as dressing.  Estimate how many servings of a food you are given. Knowing serving sizes will help you be aware of how much food you are eating at restaurants.  Where to find more information  Centers for Disease Control and Prevention: www.cdc.gov  [place]. Department of Agriculture: myplate.gov  Summary  Calorie counting means keeping track of how many calories you eat and drink each day. If you eat fewer calories than your body needs, you should lose weight.  A healthy amount of weight to lose per week is usually 1-2 lb (0.5-0.9 kg). This usually  means reducing your daily calorie intake by 500-750 calories.  The number of calories in a food can be found on a Nutrition Facts label. If a food does not have a Nutrition Facts label, try to look up the calories online or ask your dietitian for help.  Use smaller plates, glasses, and bowls for smaller portions and to prevent overeating.  Use your calories on foods and drinks that will fill you up and not leave you hungry shortly after a meal.  This information is not intended to replace advice given to you by your health care provider. Make sure you discuss any questions you have with your health care provider.  Document Revised: 01/28/2021 Document Reviewed: 01/28/2021  [employer] Patient Education © 2022 [employer]    Exercising to Lose Weight  Getting regular exercise is important for everyone. It is especially important if you are overweight. Being overweight increases your risk of heart disease, stroke, diabetes, high blood pressure, and several types of cancer. Exercising, and reducing the calories you consume, can help you lose weight and improve fitness and health.  Exercise can be moderate or vigorous intensity. To lose weight, most people need to do a certain amount of moderate or vigorous-intensity exercise each week.  How can exercise affect me?  You lose weight when you exercise enough to burn more calories than you eat. Exercise also reduces body fat and builds muscle. The more muscle you have, the more calories you burn. Exercise also:  Improves mood.  Reduces stress and tension.  Improves your overall fitness, flexibility, and endurance.  Increases bone strength.  Moderate-intensity exercise  Moderate-intensity exercise is any activity that gets you moving enough to burn at least three times more energy (calories) than if you were sitting.  Examples of moderate exercise include:  Walking a mile in 15 minutes.  Doing light yard work.  Biking at an easy pace.  Most people should get at least 150  minutes of moderate-intensity exercise a week to maintain their body weight.  Vigorous-intensity exercise  Vigorous-intensity exercise is any activity that gets you moving enough to burn at least six times more calories than if you were sitting. When you exercise at this intensity, you should be working hard enough that you are not able to carry on a conversation.  Examples of vigorous exercise include:  Running.  Playing a team sport, such as football, basketball, and soccer.  Jumping rope.  Most people should get at least 75 minutes a week of vigorous exercise to maintain their body weight.  What actions can I take to lose weight?  The amount of exercise you need to lose weight depends on:  Your age.  The type of exercise.  Any health conditions you have.  Your overall physical ability.  Talk to your health care provider about how much exercise you need and what types of activities are safe for you.  Nutrition    Make changes to your diet as told by your health care provider or diet and nutrition specialist (dietitian). This may include:  Eating fewer calories.  Eating more protein.  Eating less unhealthy fats.  Eating a diet that includes fresh fruits and vegetables, whole grains, low-fat dairy products, and lean protein.  Avoiding foods with added fat, salt, and sugar.  Drink plenty of water while you exercise to prevent dehydration or heat stroke.  Activity  Choose an activity that you enjoy and set realistic goals. Your health care provider can help you make an exercise plan that works for you.  Exercise at a moderate or vigorous intensity most days of the week.  The intensity of exercise may vary from person to person. You can tell how intense a workout is for you by paying attention to your breathing and heartbeat. Most people will notice their breathing and heartbeat get faster with more intense exercise.  Do resistance training twice each week, such as:  Push-ups.  Sit-ups.  Lifting weights.  Using  resistance bands.  Getting short amounts of exercise can be just as helpful as long, structured periods of exercise. If you have trouble finding time to exercise, try doing these things as part of your daily routine:  Get up, stretch, and walk around every 30 minutes throughout the day.  Go for a walk during your lunch break.  Park your car farther away from your destination.  If you take public transportation, get off one stop early and walk the rest of the way.  Make phone calls while standing up and walking around.  Take the stairs instead of elevators or escalators.  Wear comfortable clothes and shoes with good support.  Do not exercise so much that you hurt yourself, feel dizzy, or get very short of breath.  Where to find more information  U.S. Department of Health and Human Services: www.hhs.gov  Centers for Disease Control and Prevention: www.cdc.gov  Contact a health care provider:  Before starting a new exercise program.  If you have questions or concerns about your weight.  If you have a medical problem that keeps you from exercising.  Get help right away if:  You have any of the following while exercising:  Injury.  Dizziness.  Difficulty breathing or shortness of breath that does not go away when you stop exercising.  Chest pain.  Rapid heartbeat.  These symptoms may represent a serious problem that is an emergency. Do not wait to see if the symptoms will go away. Get medical help right away. Call your local emergency services (911 in the [place] Do not drive yourself to the hospital.  Summary  Getting regular exercise is especially important if you are overweight.  Being overweight increases your risk of heart disease, stroke, diabetes, high blood pressure, and several types of cancer.  Losing weight happens when you burn more calories than you eat.  Reducing the amount of calories you eat, and getting regular moderate or vigorous exercise each week, helps you lose weight.  This information is not  intended to replace advice given to you by your health care provider. Make sure you discuss any questions you have with your health care provider.  Document Revised: 02/13/2022 Document Reviewed: 02/13/2022  [employer]vier Patient Education © 2022 [employer]

## 2023-11-07 ENCOUNTER — TELEPHONE (OUTPATIENT)
Dept: FAMILY MEDICINE CLINIC | Facility: CLINIC | Age: 63
End: 2023-11-07
Payer: MEDICARE

## 2023-11-07 DIAGNOSIS — J45.40 MODERATE PERSISTENT ASTHMA WITHOUT COMPLICATION: Primary | ICD-10-CM

## 2023-11-07 RX ORDER — PREDNISONE 20 MG/1
20 TABLET ORAL DAILY
Qty: 7 TABLET | Refills: 0 | Status: SHIPPED | OUTPATIENT
Start: 2023-11-07

## 2023-11-07 NOTE — TELEPHONE ENCOUNTER
Caller: Loren Nur    Relationship: Self    Best call back number: [phone]     What was the call regarding: PATIENT STATED THAT SHE CANNOT USE THE INHALER THAT DR DELEON PRESCRIBED YESTERDAY. PATIENT STATED THAT THE INSTRUCTIONS STATE TO RINSE MOUTH EACH TIME IT IS USED, AND SHE CANNOT GUARANTEE THAT SHE CAN GET ALL OF THE SOLUTION OUT FROM UNDER HER DENTURES. IT SAYS IF ANY IS LEFT IN THE MOUTH, IT CAN CAUSE BLISTERS AND THRUSH.    PATIENT STATED SHE WAS ALSO OFFERED ANOTHER ROUND OF STEROIDS AND SHE DECLINED. PATIENT IS ASKING IF SHE TAKES THE STEROIDS WOULD IT HELP HER GET BETTER FASTER? IF SO, SHE WOULD LIKE TO TAKE THEM. PLEASE ADVISE.    PHARMACY:  [facility] [place], [place] - [address] [place] [address] [phone] [facility] [phone] FX     Is it okay if the provider responds through MyChart: NO

## 2023-11-15 ENCOUNTER — TELEPHONE (OUTPATIENT)
Dept: FAMILY MEDICINE CLINIC | Facility: CLINIC | Age: 63
End: 2023-11-15
Payer: MEDICARE

## 2023-11-15 NOTE — TELEPHONE ENCOUNTER
Caller: Loren Nur    Relationship: Self    Best call back number: [phone]     What is the best time to reach you: ANY TIME    Who are you requesting to speak with (clinical staff, provider,  specific staff member): CLINICAL STAFF    What was the call regarding: PATIENT STATES THAT SHE IS UNABLE TO GET RID OF A PERSISTENT COUGH AND SAYS SHE ALSO FEELS THICK PHLEGM IN HER THROAT.  SHE SAYS THAT SHE HAS PREVIOUSLY SEEN DR. ASIA DELEON FOR THIS COUGH AND THAT IT HAS BEEN ONGOING FOR 6 WEEKS.    SHE DECLINED TO MAKE AN APPOINTMENT BECAUSE SHE SAID THAT SHE CANNOT GET OFF WORK.  SHE REQUESTS A CALLBACK TO DISCUSS.    [facility] - [place], [place] - [address] [place] [address] [phone] [facility] [phone] FX      Is it okay if the provider responds through [employer]:     PLEASE ADVISE.

## 2023-11-16 DIAGNOSIS — J45.40 MODERATE PERSISTENT ASTHMA WITHOUT COMPLICATION: ICD-10-CM

## 2023-11-16 DIAGNOSIS — R05.3 CHRONIC COUGH: ICD-10-CM

## 2023-11-16 RX ORDER — DOXYCYCLINE HYCLATE 100 MG/1
100 CAPSULE ORAL 2 TIMES DAILY
Qty: 20 CAPSULE | Refills: 0 | Status: SHIPPED | OUTPATIENT
Start: 2023-11-16

## 2023-11-16 RX ORDER — CETIRIZINE HYDROCHLORIDE 10 MG/1
10 TABLET ORAL DAILY
Qty: 30 TABLET | Refills: 5 | Status: SHIPPED | OUTPATIENT
Start: 2023-11-16

## 2023-11-16 RX ORDER — FAMOTIDINE 40 MG/1
40 TABLET, FILM COATED ORAL NIGHTLY
Qty: 30 TABLET | Refills: 5 | Status: SHIPPED | OUTPATIENT
Start: 2023-11-16

## 2023-11-16 RX ORDER — IPRATROPIUM BROMIDE 42 UG/1
2 SPRAY, METERED NASAL 4 TIMES DAILY
Qty: 15 ML | Refills: 12 | Status: SHIPPED | OUTPATIENT
Start: 2023-11-16

## 2023-11-16 RX ORDER — PREDNISONE 20 MG/1
20 TABLET ORAL DAILY
Qty: 10 TABLET | Refills: 0 | Status: SHIPPED | OUTPATIENT
Start: 2023-11-16

## 2023-11-16 NOTE — TELEPHONE ENCOUNTER
Pt informed and voiced understanding. She is asking if you can give her an antibiotic as well, she feels she has an infection going on? I will say that she was coughing the whole time I was on the phone with her and it sounded really bad, it was very deep and full of congestion. Pt states her congestion is in her chest. Please advise.

## 2023-12-05 ENCOUNTER — TELEPHONE (OUTPATIENT)
Dept: FAMILY MEDICINE CLINIC | Facility: CLINIC | Age: 63
End: 2023-12-05
Payer: MEDICARE

## 2023-12-05 DIAGNOSIS — J45.40 MODERATE PERSISTENT ASTHMA WITHOUT COMPLICATION: ICD-10-CM

## 2023-12-05 RX ORDER — DOXYCYCLINE HYCLATE 100 MG/1
100 CAPSULE ORAL 2 TIMES DAILY
Qty: 20 CAPSULE | Refills: 0 | Status: SHIPPED | OUTPATIENT
Start: 2023-12-05

## 2023-12-05 RX ORDER — PREDNISONE 20 MG/1
20 TABLET ORAL DAILY
Qty: 10 TABLET | Refills: 0 | Status: SHIPPED | OUTPATIENT
Start: 2023-12-05

## 2023-12-05 NOTE — TELEPHONE ENCOUNTER
Pt Is requesting Dr. Cantu to send in another round of the doxycycline and prednisone. She finished her med's on 11/26/2023 and was feeling good until the night of 11/02/2023. The tightness in her chest and cough and wheezing has returned.

## 2023-12-11 ENCOUNTER — TELEPHONE (OUTPATIENT)
Dept: FAMILY MEDICINE CLINIC | Facility: CLINIC | Age: 63
End: 2023-12-11
Payer: MEDICARE

## 2023-12-11 NOTE — TELEPHONE ENCOUNTER
Caller: Loren Nur    Relationship to patient: Self    Best call back number: [phone]     Chief complaint: ONGOING COUGH, BACK PAIN    Type of visit: OFFICE VISIT    Requested date: ASAP     Additional notes:PATIENT STATED SHE DOES NOT WANT TO SEE AN APRN, AND WOULD ONLY LIKE TO SEE DR DELEON.    PATIENT IS REQUESTING SHA CALL HER ASAP

## 2023-12-12 ENCOUNTER — OFFICE VISIT (OUTPATIENT)
Dept: FAMILY MEDICINE CLINIC | Facility: CLINIC | Age: 63
End: 2023-12-12
Payer: MEDICARE

## 2023-12-12 VITALS
BODY MASS INDEX: 35.32 KG/M2 | DIASTOLIC BLOOD PRESSURE: 88 MMHG | HEART RATE: 91 BPM | SYSTOLIC BLOOD PRESSURE: 138 MMHG | WEIGHT: 212 LBS | HEIGHT: 65 IN | OXYGEN SATURATION: 99 %

## 2023-12-12 DIAGNOSIS — J30.1 NON-SEASONAL ALLERGIC RHINITIS DUE TO POLLEN: ICD-10-CM

## 2023-12-12 DIAGNOSIS — K21.9 GASTROESOPHAGEAL REFLUX DISEASE, UNSPECIFIED WHETHER ESOPHAGITIS PRESENT: ICD-10-CM

## 2023-12-12 DIAGNOSIS — J45.909 ACUTE ASTHMA: Primary | ICD-10-CM

## 2023-12-12 PROCEDURE — 1160F RVW MEDS BY RX/DR IN RCRD: CPT | Performed by: FAMILY MEDICINE

## 2023-12-12 PROCEDURE — 99214 OFFICE O/P EST MOD 30 MIN: CPT | Performed by: FAMILY MEDICINE

## 2023-12-12 PROCEDURE — 1159F MED LIST DOCD IN RCRD: CPT | Performed by: FAMILY MEDICINE

## 2023-12-12 RX ORDER — BROMPHENIRAMINE MALEATE, PSEUDOEPHEDRINE HYDROCHLORIDE, AND DEXTROMETHORPHAN HYDROBROMIDE 2; 30; 10 MG/5ML; MG/5ML; MG/5ML
5 SYRUP ORAL 4 TIMES DAILY PRN
Qty: 250 ML | Refills: 0 | Status: SHIPPED | OUTPATIENT
Start: 2023-12-12

## 2023-12-12 RX ORDER — METHYLPREDNISOLONE 4 MG/1
TABLET ORAL
Qty: 21 TABLET | Refills: 0 | Status: SHIPPED | OUTPATIENT
Start: 2023-12-12

## 2023-12-12 RX ORDER — OMEPRAZOLE 40 MG/1
40 CAPSULE, DELAYED RELEASE ORAL DAILY
Qty: 90 CAPSULE | Refills: 1 | Status: SHIPPED | OUTPATIENT
Start: 2023-12-12

## 2023-12-12 NOTE — PROGRESS NOTES
Regan Nur is a 63 y.o. female. Presents today for   Chief Complaint   Patient presents with    Cough     dry     Answers submitted by the patient for this visit:  Other (Submitted on 12/11/2023)  Please describe your symptoms.: Wheezing, hurting in my back. Sinus stuffed up.  Have you had these symptoms before?: Yes  How long have you been having these symptoms?: Greater than 2 weeks  Please list any medications you are currently taking for this condition.: Predisone, antibiotic, ibuprofen Tylenol and cold medicine  Please describe any probable cause for these symptoms. : Asthma  Primary Reason for Visit (Submitted on 12/11/2023)  What is the primary reason for your visit?: Other    History of Present Illness  Patient 64 yo female with hx of allertgeis and asthma has had several weeks of cough, congestion and wheezing despite aggressive treatment with antihistamines, montelukast, steroids and abx;  usually this time of year bronchitis but resolves with steroid and abx;     Stopped abx as causing back pain;  This started after yard work.    Review of Systems   Constitutional:  Negative for chills and fever.   Respiratory:  Positive for cough, chest tightness, shortness of breath and wheezing.    Cardiovascular:  Negative for chest pain.   Allergic/Immunologic: Positive for environmental allergies.       Patient Active Problem List   Diagnosis    Depression with anxiety    Atypical chest pain    Adult body mass index greater than 30    Elevated blood-pressure reading without diagnosis of hypertension    Adiposity    Overweight    Encounter for screening for other disorder       Social History     Socioeconomic History    Marital status:    Tobacco Use    Smoking status: Never    Smokeless tobacco: Never   Vaping Use    Vaping Use: Never used   Substance and Sexual Activity    Alcohol use: Yes     Alcohol/week: 7.0 standard drinks of alcohol     Types: 7 Cans of beer per week    Drug use: No     Sexual activity: Defer       No Known Allergies    Current Outpatient Medications on File Prior to Visit   Medication Sig Dispense Refill    albuterol sulfate HFA (ProAir HFA) 108 (90 Base) MCG/ACT inhaler Inhale 2 puffs Every 4 (Four) Hours As Needed for Wheezing or Shortness of Air. proair 6.7 g 12    ascorbic acid (VITAMIN C) 500 MG tablet Take 1 tablet by mouth Daily.      buPROPion XL (WELLBUTRIN XL) 300 MG 24 hr tablet Take 1 tablet by mouth Every Morning. 90 tablet 1    cetirizine (zyrTEC) 10 MG tablet Take 1 tablet by mouth Daily. 30 tablet 5    cholecalciferol (VITAMIN D3) 25 MCG (1000 UT) tablet Take 1 tablet by mouth Daily.      famotidine (PEPCID) 40 MG tablet Take 1 tablet by mouth Every Night. 30 tablet 5    furosemide (Lasix) 40 MG tablet Take 1 tablet by mouth Daily As Needed (leg swelling). 30 tablet 2    ipratropium (ATROVENT) 0.06 % nasal spray 2 sprays into the nostril(s) as directed by provider 4 (Four) Times a Day. 15 mL 12    montelukast (SINGULAIR) 10 MG tablet Take 1 tablet by mouth Every Night. 90 tablet 3    potassium chloride 10 MEQ CR tablet Take 1 tablet by mouth Daily As Needed (leg swelling if take furosemide). 90 tablet 1    [DISCONTINUED] predniSONE (DELTASONE) 20 MG tablet Take 1 tablet by mouth Daily. 10 tablet 0    [DISCONTINUED] budesonide-formoterol (SYMBICORT) 160-4.5 MCG/ACT inhaler Inhale 2 puffs 2 (Two) Times a Day. Rinse mouth after use (Patient not taking: Reported on 12/12/2023) 10.2 g 12    [DISCONTINUED] doxycycline (VIBRAMYCIN) 100 MG capsule Take 1 capsule by mouth 2 (Two) Times a Day. (Patient not taking: Reported on 12/12/2023) 20 capsule 0    [DISCONTINUED] Spacer/Aero-Holding Chambers device Use with symbicort twice daily (Patient not taking: Reported on 12/12/2023) 1 each 0     No current facility-administered medications on file prior to visit.       Objective   Vitals:    12/12/23 1356   BP: 138/88   Pulse: 91   SpO2: 99%   Weight: 96.2 kg (212 lb)   Height:  "165.1 cm (65\")     Body mass index is 35.28 kg/m².    Physical Exam  Vitals and nursing note reviewed.   Constitutional:       Appearance: She is well-developed.   HENT:      Head: Normocephalic and atraumatic.      Right Ear: Tympanic membrane normal.      Left Ear: Tympanic membrane normal.      Nose: Mucosal edema and congestion present.   Neck:      Thyroid: No thyromegaly.      Vascular: No JVD.   Cardiovascular:      Rate and Rhythm: Normal rate and regular rhythm.      Heart sounds: Normal heart sounds. No murmur heard.     No friction rub. No gallop.   Pulmonary:      Effort: Pulmonary effort is normal. No respiratory distress.      Breath sounds: Decreased air movement present. Decreased breath sounds and wheezing present. No rales.   Abdominal:      General: Bowel sounds are normal. There is no distension.      Palpations: Abdomen is soft.      Tenderness: There is no abdominal tenderness. There is no guarding or rebound.   Musculoskeletal:      Cervical back: Neck supple.   Skin:     General: Skin is warm and dry.   Neurological:      Mental Status: She is alert.   Psychiatric:         Behavior: Behavior normal.         Assessment & Plan   Diagnoses and all orders for this visit:    1. Acute asthma (Primary)  -     methylPREDNISolone (MEDROL) 4 MG dose pack; Take as directed on package instructions.  Dispense: 21 tablet; Refill: 0    2. Gastroesophageal reflux disease, unspecified whether esophagitis present  -     omeprazole (priLOSEC) 40 MG capsule; Take 1 capsule by mouth Daily.  Dispense: 90 capsule; Refill: 1    3. Non-seasonal allergic rhinitis due to pollen  -     brompheniramine-pseudoephedrine-DM 30-2-10 MG/5ML syrup; Take 5 mL by mouth 4 (Four) Times a Day As Needed for Congestion or Cough.  Dispense: 250 mL; Refill: 0  -     methylPREDNISolone (MEDROL) 4 MG dose pack; Take as directed on package instructions.  Dispense: 21 tablet; Refill: 0        Gave 8 weeks of trelegy 200mcg 1 puff " daily  Intolerant codeine and doesn't want overly sedating cough syrup  Cotnineu H1 Blocker and singulair  ? Gerd induced as waking up choking with reflux, will go up on prilosec to 40mg as taking otc now                 -Follow up: Prn - RTC if worse or no improvement.

## 2024-04-15 ENCOUNTER — TELEPHONE (OUTPATIENT)
Dept: FAMILY MEDICINE CLINIC | Facility: CLINIC | Age: 64
End: 2024-04-15

## 2024-04-15 RX ORDER — AMOXICILLIN AND CLAVULANATE POTASSIUM 875; 125 MG/1; MG/1
1 TABLET, FILM COATED ORAL 2 TIMES DAILY
Qty: 20 TABLET | Refills: 0 | Status: SHIPPED | OUTPATIENT
Start: 2024-04-15

## 2024-04-15 NOTE — TELEPHONE ENCOUNTER
PATIENT CALLED AND STATES SHE HAS A SINUS INFECTION AND WANTS TO KNOW IF DR. DELEON WILL CALL HER IN SOMETHING. APPOINTMENT OFFERED    [facility] - [place], [place] - [address] [place] [address] - [phone]  - [phone]  [phone]     CALL BACK NUMBER [phone]

## 2024-05-28 DIAGNOSIS — K21.9 GASTROESOPHAGEAL REFLUX DISEASE, UNSPECIFIED WHETHER ESOPHAGITIS PRESENT: ICD-10-CM

## 2024-05-28 RX ORDER — OMEPRAZOLE 40 MG/1
40 CAPSULE, DELAYED RELEASE ORAL DAILY
Qty: 90 CAPSULE | Refills: 0 | Status: SHIPPED | OUTPATIENT
Start: 2024-05-28

## 2025-01-15 ENCOUNTER — OFFICE VISIT (OUTPATIENT)
Dept: FAMILY MEDICINE CLINIC | Facility: CLINIC | Age: 65
End: 2025-01-15
Payer: MEDICARE

## 2025-01-15 ENCOUNTER — TELEPHONE (OUTPATIENT)
Dept: FAMILY MEDICINE CLINIC | Facility: CLINIC | Age: 65
End: 2025-01-15

## 2025-01-15 VITALS
OXYGEN SATURATION: 95 % | HEART RATE: 82 BPM | DIASTOLIC BLOOD PRESSURE: 84 MMHG | HEIGHT: 65 IN | BODY MASS INDEX: 36.32 KG/M2 | SYSTOLIC BLOOD PRESSURE: 136 MMHG | WEIGHT: 218 LBS | TEMPERATURE: 98.3 F

## 2025-01-15 DIAGNOSIS — J20.9 ACUTE BRONCHITIS, UNSPECIFIED ORGANISM: Primary | ICD-10-CM

## 2025-01-15 DIAGNOSIS — J45.41 MODERATE PERSISTENT ASTHMA WITH ACUTE EXACERBATION: ICD-10-CM

## 2025-01-15 PROCEDURE — 1126F AMNT PAIN NOTED NONE PRSNT: CPT

## 2025-01-15 PROCEDURE — 1160F RVW MEDS BY RX/DR IN RCRD: CPT

## 2025-01-15 PROCEDURE — 99213 OFFICE O/P EST LOW 20 MIN: CPT

## 2025-01-15 PROCEDURE — 1159F MED LIST DOCD IN RCRD: CPT

## 2025-01-15 RX ORDER — LOSARTAN POTASSIUM 50 MG/1
50 TABLET ORAL DAILY
COMMUNITY
Start: 2024-11-19 | End: 2025-11-19

## 2025-01-15 RX ORDER — DOXYCYCLINE 100 MG/1
100 CAPSULE ORAL 2 TIMES DAILY
Qty: 20 CAPSULE | Refills: 0 | Status: SHIPPED | OUTPATIENT
Start: 2025-01-15 | End: 2025-01-25

## 2025-01-15 RX ORDER — METOPROLOL SUCCINATE 50 MG/1
25 TABLET, EXTENDED RELEASE ORAL DAILY
COMMUNITY
Start: 2024-11-19 | End: 2025-11-19

## 2025-01-15 RX ORDER — FLUTICASONE FUROATE, UMECLIDINIUM BROMIDE AND VILANTEROL TRIFENATATE 100; 62.5; 25 UG/1; UG/1; UG/1
1 POWDER RESPIRATORY (INHALATION)
Qty: 60 EACH | Refills: 1 | Status: SHIPPED | OUTPATIENT
Start: 2025-01-15

## 2025-01-15 RX ORDER — CHROMIUM PICOLINATE 200 MCG
TABLET ORAL
COMMUNITY

## 2025-01-15 RX ORDER — ATORVASTATIN CALCIUM 40 MG/1
TABLET, FILM COATED ORAL
COMMUNITY
Start: 2024-12-06

## 2025-01-15 RX ORDER — MULTIVITAMIN
1 CAPSULE ORAL DAILY
COMMUNITY

## 2025-01-15 RX ORDER — METHYLPREDNISOLONE 4 MG/1
TABLET ORAL
Qty: 21 TABLET | Refills: 0 | Status: SHIPPED | OUTPATIENT
Start: 2025-01-15

## 2025-01-15 NOTE — PROGRESS NOTES
Regan Nur is a 64 y.o. female.     Chief Complaint   Patient presents with    Cough     X 3 weeks. Advil cold and sinus OVC    Sinus Problem       History of Present Illness  The patient is a 64-year-old female who presents for evaluation of cough.    Cough and Associated Symptoms  She began experiencing symptoms during the week of Christmas, which have persisted for approximately 3 weeks. She reports an annual occurrence of similar symptoms, typically managed by Dr. Cantu, primarily manifesting as a persistent cough that she attributes to her asthma. The cough is severe enough to disrupt her eating and drinking habits. She has not sought any treatment for this episode yet. She does not report any fevers in the past few days or over the past 3 weeks. Her cough was productive last night, but it has been mostly dry. She experienced a coughing episode after eating last night, which led to vomiting. She also reports difficulty breathing during coughing fits. She has been using Advil Cold and Sinus over the counter. She reports no sore throat. She has been experiencing nasal congestion for the past 3 weeks and has been using a nasal spray ordered through [employer]. She reports more pressure on the right side of her head. She reports no jaw pain or toothache. She has dentures. She has a humidifier at home. She has a history of bronchitis.  - Onset: During the week of Christmas.  - Location: Primarily respiratory system, with nasal congestion and pressure on the right side of her head.  - Duration: Approximately 3 weeks.  - Character: Persistent cough, mostly dry but occasionally productive; nasal congestion; pressure on the right side of her head.  - Alleviating/Aggravating Factors: Using Advil Cold and Sinus; nasal spray ordered through [employer]; humidifier at home.  - Timing: Persistent for approximately 3 weeks; coughing episode after eating last night led to vomiting.  - Severity: Severe enough to  disrupt eating and drinking habits; difficulty breathing during coughing fits.    Asthma and Respiratory History  She has a history of asthma but reports no other respiratory conditions such as COPD. She was previously prescribed an albuterol inhaler, which was replaced with Trelegy last year. She received samples of Trelegy from her doctor but did not fill a prescription for it. She is uncertain about the dosage of Trelegy she was taking. She recalls a particularly severe episode last year that required prolonged recovery and multiple treatment attempts. She typically receives a steroid prescription annually. She reports feeling fatigued due to her breathing difficulties.    Ear and Head Discomfort  She reports discomfort in her right ear, pressure behind her eyes, and a headache that has been present for the past few weeks.  - Onset: Past few weeks.  - Location: Right ear, behind the eyes, and head.  - Duration: Past few weeks.  - Character: Discomfort in the right ear, pressure behind the eyes, and headache.    Supplemental Information  She does not have diabetes.    ALLERGIES  - No known allergies    MEDICATIONS  - Current: Trelegy, Advil cold and sinus  - Past: Albuterol inhaler, Augmentin       The following portions of the patient's history were reviewed and updated as appropriate: allergies, current medications, past family history, past medical history, past social history, past surgical history and problem list.    Results         Objective     Vitals:    01/15/25 1708   BP: 136/84   Pulse: 82   Temp: 98.3 °F (36.8 °C)   SpO2: 95%      Body mass index is 36.28 kg/m².    Physical Exam  Vitals reviewed.   Constitutional:       General: She is not in acute distress.     Appearance: Normal appearance. She is obese. She is not toxic-appearing.   HENT:      Right Ear: Tympanic membrane, ear canal and external ear normal.      Left Ear: Tympanic membrane, ear canal and external ear normal.      Nose:      Right  Sinus: Maxillary sinus tenderness present.      Left Sinus: Maxillary sinus tenderness present.   Eyes:      Conjunctiva/sclera: Conjunctivae normal.   Cardiovascular:      Rate and Rhythm: Normal rate and regular rhythm.   Pulmonary:      Effort: Pulmonary effort is normal.      Breath sounds: Rhonchi present.   Skin:     General: Skin is warm and dry.   Neurological:      Mental Status: She is alert and oriented to person, place, and time.   Psychiatric:         Behavior: Behavior normal.         Assessment & Plan  1. Acute bronchitis  - Persistent cough since the week of Christmas, recently productive  - Coughing fits triggered by eating and drinking, leading to vomiting  - Inflammation at the bases of lungs observed  - Prescription for an antibiotic to be sent to [facility] pharmacy  - Start antibiotic and monitor symptoms  - If no improvement, begin oral steroid pack  - Use Airsupra as a rescue inhaler every 4 hours as needed    2. Asthma exacerbation  - History of asthma, previously switched from albuterol inhaler to Trelegy  - Trelegy has been helpful  - Prescription for Trelegy 100 to be sent to [facility] pharmacy  - If insurance does not cover Trelegy, inform us for an alternative maintenance inhaler  - Use Airsupra (contains albuterol and budesonide) as a rescue inhaler    3. Right ear pain  - Reports right ear pain, pressure behind eyes, and headache over the past couple of weeks  - Continue using nasal spray as ordered, follow packaging instructions  - Use nasal saline rinses, a humidifier at night, and take steamy showers to alleviate pressure       Discussed Care Gaps, ordered referrals and encouraged vaccination updates.       - Pt agrees with plan of care and denies further questions/concerns today  - This document is intended for medical expert use only. Persons  reading this document without medical staff guidance may result in misinterpretation and unintended morbidity     Go to the ER for any possible  life-threatening symptoms such as chest pain or shortness of air.      Please allow 3-5 business days for recommendations based on new results      I personally spent time with this patient, preparing for the visit, reviewing tests, obtaining and/or reviewing a separately obtained history, performing a medically appropriate examination and/or evaluation, counseling and educating the patient/family/caregiver, ordering medications,  documenting information in the medical record and indepentently interpreting results.       Patient or patient representative verbalized consent for the use of Ambient Listening during the visit with  DENIA Madison for chart documentation. 1/15/2025  17:39 EST

## 2025-01-15 NOTE — PATIENT INSTRUCTIONS

## 2025-01-15 NOTE — TELEPHONE ENCOUNTER
Caller: Loren Nur    Relationship: Self    Best call back number: [phone]     What medication are you requesting: ANTIBIOTIC    What are your current symptoms: COUGH-CONGESTION-NOT ABLE TO EAD OR DRINK DUE TO COUGH    How long have you been experiencing symptoms: BEFORE RAYA    Have you had these symptoms before:    [] Yes  [x] No    Have you been treated for these symptoms before:   [] Yes  [x] No    If a prescription is needed, what is your preferred pharmacy and phone number:    [facility] - [place], [place] - [address] [place] [address] - [phone] [facility] [phone] FX     Additional notes:NO OVER THE COUNTER MEDICATIONS HAVE HELPED

## 2025-02-03 ENCOUNTER — OFFICE VISIT (OUTPATIENT)
Dept: FAMILY MEDICINE CLINIC | Facility: CLINIC | Age: 65
End: 2025-02-03
Payer: MEDICARE

## 2025-02-03 VITALS
BODY MASS INDEX: 36.82 KG/M2 | SYSTOLIC BLOOD PRESSURE: 118 MMHG | OXYGEN SATURATION: 93 % | HEART RATE: 84 BPM | WEIGHT: 221 LBS | HEIGHT: 65 IN | DIASTOLIC BLOOD PRESSURE: 90 MMHG

## 2025-02-03 DIAGNOSIS — E55.9 VITAMIN D DEFICIENCY: ICD-10-CM

## 2025-02-03 DIAGNOSIS — E78.5 DYSLIPIDEMIA: ICD-10-CM

## 2025-02-03 DIAGNOSIS — Z00.00 MEDICARE ANNUAL WELLNESS VISIT, SUBSEQUENT: Primary | ICD-10-CM

## 2025-02-03 DIAGNOSIS — I10 PRIMARY HYPERTENSION: ICD-10-CM

## 2025-02-03 DIAGNOSIS — R53.83 OTHER FATIGUE: ICD-10-CM

## 2025-02-03 DIAGNOSIS — E66.9 OBESITY (BMI 30-39.9): ICD-10-CM

## 2025-02-03 DIAGNOSIS — I25.10 CORONARY ARTERY DISEASE INVOLVING NATIVE CORONARY ARTERY OF NATIVE HEART WITHOUT ANGINA PECTORIS: ICD-10-CM

## 2025-02-03 DIAGNOSIS — M79.10 MYALGIA: ICD-10-CM

## 2025-02-03 RX ORDER — LOSARTAN POTASSIUM 100 MG/1
TABLET ORAL
Qty: 90 TABLET | Refills: 3 | Status: SHIPPED | OUTPATIENT
Start: 2025-02-03

## 2025-02-03 RX ORDER — SEMAGLUTIDE 0.68 MG/ML
INJECTION, SOLUTION SUBCUTANEOUS
Qty: 3 ML | Refills: 0 | Status: SHIPPED | OUTPATIENT
Start: 2025-02-03 | End: 2025-02-10 | Stop reason: ALTCHOICE

## 2025-02-03 NOTE — PROGRESS NOTES
QUICK REFERENCE INFORMATION:  The ABCs of the Annual Wellness Visit    Subsequent Medicare Wellness Visit    HEALTH RISK ASSESSMENT    1960  Loren Nur is a 64 y.o. female who presents for an Subsequent Wellness Visit.    The following portions of the patient's history were reviewed and   updated as appropriate: allergies, current medications, past family history, past medical history, past social history, past surgical history, and problem list.    Compared to one year ago, the patient feels his physical   health is the same.    Compared to one year ago, the patient feels his mental   health is the same.    Recent Hospitalizations:  She was not admitted to the hospital during the last year.     Current Medical Providers:  Patient Care Team:  David Cantu DO as PCP - General  Julia Quevedo MD as Consulting Physician (Cardiology)    I reviewed list of current Medical providers and suppliers with patient and are listed above to the best of the patient's and my knowledge.    Smoking Status:  Social History     Tobacco Use   Smoking Status Never    Passive exposure: Never   Smokeless Tobacco Never       Alcohol Consumption:  Social History     Substance and Sexual Activity   Alcohol Use Yes    Alcohol/week: 7.0 standard drinks of alcohol    Types: 7 Cans of beer per week       Depression Screen:       2/3/2025     5:00 PM   PHQ-2/PHQ-9 Depression Screening   Little interest or pleasure in doing things Not at all   Feeling down, depressed, or hopeless Not at all       Health Habits and Functional and Cognitive Screenin/3/2025     5:00 PM   Functional & Cognitive Status   Do you have difficulty preparing food and eating? No   Do you have difficulty bathing yourself, getting dressed or grooming yourself? No   Do you have difficulty using the toilet? No   Do you have difficulty moving around from place to place? No   Do you have trouble with steps or getting out of a bed or a chair? No   Current  "Diet Well Balanced Diet   Dental Exam Up to date   Eye Exam Up to date   Exercise (times per week) 2 times per week   Current Exercises Include House Cleaning   Do you need help using the phone?  No   Are you deaf or do you have serious difficulty hearing?  No   Do you need help to go to places out of walking distance? No   Do you need help shopping? No   Do you need help preparing meals?  No   Do you need help with housework?  No   Do you need help with laundry? No   Do you need help taking your medications? No   Do you need help managing money? No   Do you ever drive or ride in a car without wearing a seat belt? No   Have you felt unusual stress, anger or loneliness in the last month? No   Who do you live with? Alone   If you need help, do you have trouble finding someone available to you? No   Have you been bothered in the last four weeks by sexual problems? No   Do you have difficulty concentrating, remembering or making decisions? No       Does the patient have evidence of cognitive impairment? No    Aspirin use counseling: Does not need ASA (and currently is not on it)    Recent Lab Results:  CMP:  Lab Results   Component Value Date    Glucose 89 04/23/2019    BUN 21 04/23/2019    BUN/Creatinine Ratio 20 04/23/2019    Creatinine 1.05 (H) 04/23/2019    Total CO2 26 04/23/2019    Calcium 9.5 04/23/2019    Albumin 4.5 04/23/2019    AST (SGOT) 18 04/23/2019    ALT (SGPT) 21 04/23/2019     Lipid Panel:  Lab Results   Component Value Date    Chol/HDL Ratio 4 02/08/2025    Total Cholesterol 192 02/08/2025    Triglycerides 167 (H) 02/08/2025    HDL Cholesterol 48 02/08/2025    VLDL Cholesterol 40 04/23/2019     HbA1c:  No components found for: \"HGBA1C\"    Visual Acuity:  No results found.    Age-appropriate Screening Schedule:  Refer to the list below for future screening recommendations based on patient's age, sex and/or medical conditions. Orders for these recommended tests are listed in the plan section. The " patient has been provided with a written plan.    Health Maintenance   Topic Date Due    DXA SCAN  Never done    MAMMOGRAM  09/12/2018    COVID-19 Vaccine (5 - 2024-25 season) 09/01/2024    PAP SMEAR  01/20/2026 (Originally 9/12/2019)    Pneumococcal Vaccine 65+ (1 of 2 - PCV) 02/03/2026 (Originally 10/31/1977)    ANNUAL WELLNESS VISIT  02/03/2026    BMI FOLLOWUP  02/03/2026    TDAP/TD VACCINES (2 - Td or Tdap) 09/12/2026    COLORECTAL CANCER SCREENING  11/13/2026    INFLUENZA VACCINE  Completed    HEPATITIS C SCREENING  Addressed          Outpatient Medications Prior to Visit   Medication Sig Dispense Refill    APPLE CIDER VINEGAR PO Take  by mouth.      ascorbic acid (VITAMIN C) 500 MG tablet Take 1 tablet by mouth Daily.      ASHWAGANDHA PO Take 150 mg by mouth.      BIOTIN PO Take  by mouth.      BLACK COHOSH PO Take  by mouth.      buPROPion XL (WELLBUTRIN XL) 300 MG 24 hr tablet Take 1 tablet by mouth Every Morning. 90 tablet 1    cetirizine (zyrTEC) 10 MG tablet Take 1 tablet by mouth Daily. 30 tablet 5    cholecalciferol (VITAMIN D3) 25 MCG (1000 UT) tablet Take 1 tablet by mouth Daily.      Chromium Picolinate 200 MCG tablet Take  by mouth.      COLLAGEN PO Take  by mouth.      EVENING PRIMROSE OIL PO Take  by mouth.      metoprolol succinate XL (TOPROL-XL) 50 MG 24 hr tablet Take 0.5 tablets by mouth Daily.      Multiple Vitamin (multivitamin) capsule Take 1 capsule by mouth Daily.      omeprazole (priLOSEC) 40 MG capsule Take 1 capsule by mouth Daily. 90 capsule 0    TURMERIC PO Take  by mouth.      VITAMIN D-VITAMIN K PO Take  by mouth.      losartan (COZAAR) 50 MG tablet Take 1 tablet by mouth Daily.      atorvastatin (LIPITOR) 40 MG tablet TAKE (1) TABLET BEFORE BREAKFAST (Patient not taking: Reported on 2/3/2025)      Fluticasone-Umeclidin-Vilant (Trelegy Ellipta) 100-62.5-25 MCG/ACT inhaler Inhale 1 puff Daily. (Patient not taking: Reported on 2/3/2025) 60 each 1    ipratropium (ATROVENT) 0.06 %  nasal spray 2 sprays into the nostril(s) as directed by provider 4 (Four) Times a Day. (Patient not taking: Reported on 2/3/2025) 15 mL 12    methylPREDNISolone (MEDROL) 4 MG dose pack Take as directed on package instructions. (Patient not taking: Reported on 2/3/2025) 21 tablet 0     No facility-administered medications prior to visit.       Patient Active Problem List   Diagnosis    Depression with anxiety    Atypical chest pain    Adult body mass index greater than 30    Elevated blood-pressure reading without diagnosis of hypertension    Adiposity    Overweight    Encounter for screening for other disorder       Advance Care Planning:  ACP discussion was held with the patient during this visit. Patient does not have an advance directive, information provided.    Identification of Risk Factors:  Risk factors include: Obesity/Overweight .    Review of Systems   Constitutional:  Positive for fatigue.   Respiratory:  Negative for shortness of breath.    Cardiovascular:  Negative for chest pain and palpitations.   Gastrointestinal:  Negative for abdominal pain.   Musculoskeletal:  Positive for myalgias.   Neurological:  Negative for headaches.       Objective     Physical Exam  Vitals and nursing note reviewed.   Constitutional:       Appearance: She is well-developed.   HENT:      Head: Normocephalic and atraumatic.   Neck:      Thyroid: No thyromegaly.      Vascular: No JVD.   Cardiovascular:      Rate and Rhythm: Normal rate and regular rhythm.      Heart sounds: Normal heart sounds. No murmur heard.     No friction rub. No gallop.   Pulmonary:      Effort: Pulmonary effort is normal. No respiratory distress.      Breath sounds: Normal breath sounds. No wheezing or rales.   Abdominal:      General: Bowel sounds are normal. There is no distension.      Palpations: Abdomen is soft.      Tenderness: There is no abdominal tenderness. There is no guarding or rebound.   Musculoskeletal:      Cervical back: Neck supple.  "  Skin:     General: Skin is warm and dry.   Neurological:      Mental Status: She is alert.      Gait: Gait normal.   Psychiatric:         Behavior: Behavior normal.         Vitals:    02/03/25 1717   BP: 118/90   Pulse: 84   SpO2: 93%   Weight: 100 kg (221 lb)   Height: 165.1 cm (65\")   PainSc: 0-No pain     Body mass index is 36.78 kg/m².    Class 2 Severe Obesity (BMI >=35 and <=39.9). Obesity-related health conditions include the following: hypertension, coronary heart disease, and dyslipidemias. Obesity is unchanged. BMI is is above average; BMI management plan is completed. We discussed portion control and increasing exercise.      Assessment & Plan   Patient Self-Management and Personalized Health Advice  The patient has been provided with information about: diet and exercise and preventive services including:   Annual Wellness Visit (AWV).    Visit Diagnoses:    ICD-10-CM ICD-9-CM   1. Medicare annual wellness visit, subsequent  Z00.00 V70.0   2. Coronary artery disease involving native coronary artery of native heart without angina pectoris  I25.10 414.01   3. Dyslipidemia  E78.5 272.4   4. Primary hypertension  I10 401.9   5. Myalgia  M79.10 729.1   6. Other fatigue  R53.83 780.79   7. Vitamin D deficiency  E55.9 268.9   8. Obesity (BMI 30-39.9)  E66.9 278.00       Orders Placed This Encounter   Procedures    Comprehensive Metabolic Panel     Order Specific Question:   Release to patient     Answer:   Routine Release [[id number]]    Lipid Panel     Order Specific Question:   Release to patient     Answer:   Routine Release [[id number]]    TSH     Order Specific Question:   Release to patient     Answer:   Routine Release [[id number]]    Vitamin D,25-Hydroxy     Order Specific Question:   Release to patient     Answer:   Routine Release [[id number]]    CBC (No Diff)     Order Specific Question:   Release to patient     Answer:   Routine Release [[id number]]    Magnesium     Order Specific Question:   " Release to patient     Answer:   Routine Release [[id number]]       Outpatient Encounter Medications as of 2/3/2025   Medication Sig Dispense Refill    APPLE CIDER VINEGAR PO Take  by mouth.      ascorbic acid (VITAMIN C) 500 MG tablet Take 1 tablet by mouth Daily.      ASHWAGANDHA PO Take 150 mg by mouth.      BIOTIN PO Take  by mouth.      BLACK COHOSH PO Take  by mouth.      buPROPion XL (WELLBUTRIN XL) 300 MG 24 hr tablet Take 1 tablet by mouth Every Morning. 90 tablet 1    cetirizine (zyrTEC) 10 MG tablet Take 1 tablet by mouth Daily. 30 tablet 5    cholecalciferol (VITAMIN D3) 25 MCG (1000 UT) tablet Take 1 tablet by mouth Daily.      Chromium Picolinate 200 MCG tablet Take  by mouth.      COLLAGEN PO Take  by mouth.      EVENING PRIMROSE OIL PO Take  by mouth.      losartan (COZAAR) 100 MG tablet 1 po daily 90 tablet 3    metoprolol succinate XL (TOPROL-XL) 50 MG 24 hr tablet Take 0.5 tablets by mouth Daily.      Multiple Vitamin (multivitamin) capsule Take 1 capsule by mouth Daily.      omeprazole (priLOSEC) 40 MG capsule Take 1 capsule by mouth Daily. 90 capsule 0    TURMERIC PO Take  by mouth.      VITAMIN D-VITAMIN K PO Take  by mouth.      [DISCONTINUED] losartan (COZAAR) 50 MG tablet Take 1 tablet by mouth Daily.      Semaglutide,0.25 or 0.5MG/DOS, (Ozempic, 0.25 or 0.5 MG/DOSE,) 2 MG/3ML solution pen-injector 0.25mg sc weekly x 2 w, then 0.5mg weekly and call for 1mg dose 3 mL 0    [DISCONTINUED] atorvastatin (LIPITOR) 40 MG tablet TAKE (1) TABLET BEFORE BREAKFAST (Patient not taking: Reported on 2/3/2025)      [DISCONTINUED] Fluticasone-Umeclidin-Vilant (Trelegy Ellipta) 100-62.5-25 MCG/ACT inhaler Inhale 1 puff Daily. (Patient not taking: Reported on 2/3/2025) 60 each 1    [DISCONTINUED] ipratropium (ATROVENT) 0.06 % nasal spray 2 sprays into the nostril(s) as directed by provider 4 (Four) Times a Day. (Patient not taking: Reported on 2/3/2025) 15 mL 12    [DISCONTINUED] methylPREDNISolone (MEDROL) 4  MG dose pack Take as directed on package instructions. (Patient not taking: Reported on 2/3/2025) 21 tablet 0     No facility-administered encounter medications on file as of 2/3/2025.       Reviewed use of high risk medication in the elderly: yes  Reviewed for potential of harmful drug interactions in the elderly: yes  No opioid medication identified on active medication list. I have reviewed chart for other potential  high risk medication/s and harmful drug interactions in the elderly.    Social History     Socioeconomic History    Marital status:    Tobacco Use    Smoking status: Never     Passive exposure: Never    Smokeless tobacco: Never   Vaping Use    Vaping status: Never Used   Substance and Sexual Activity    Alcohol use: Yes     Alcohol/week: 7.0 standard drinks of alcohol     Types: 7 Cans of beer per week    Drug use: No    Sexual activity: Defer     Patient was screened for OUD and MARTINE risk factors.  Loren Bellos pain level was assessed as well today.  I included a review current recommendations on pain management, best use practices, current CDC guidelines, alternatives to opioids including OTC & Rx topicals, non-opioid oral medications (eg nsaids, acetominophen) and life style interventions such as yoga, lisette chi, stretching, regular exercise, PT/OT and referral to specialty care like Pain Management that specialize in pain treatment when appropriate.   I also included a review of serious risks of opioid use and substance use disorder.  I have included all of this in the after visit summary along with other risk factors identified that are pertinent to the patient today.      Follow Up:  Return in about 3 months (around 5/3/2025), or if symptoms worsen or fail to improve.     An After Visit Summary and PPPS with all of these plans were given to the patient.           ++++++++++++++++++++++++++++++++++++++++++++++++++++++++++++++++++   Additional E&M Note during same encounter  "follows:  Patient has multiple medical problems which are significant and separately identifiable that require additional work above and beyond the Medicare Wellness Visit.   Chief Complaint   Patient presents with    Medicare Wellness-subsequent    Hypertension    Hyperlipidemia    Coronary Artery Disease     HPI  Patient 63 y/o female, recently seen for bronchitis and is doing better.   She was having paliptations and chest pain, saw cardiology and sent for left heart cath for concerns of unstable angina;  C noted nonobstructive cad (see below).  Was started on beta blocker and on losartan, but bp still too high;   no headaches; no cp/soa now;   She refused statin as told by neurologist that long-term statin use why her  likely had dementia;  Also he was on anti-htn for several years, so nervous about takign as well.   She would liek help losing weight and agreeable to medication for this.  She is also having a lot of leg cramps at night (as mentioned not on statin).    Review of Systems   Constitutional: Positive for fatigue.   Cardiovascular:  Negative for chest pain and palpitations.   Respiratory:  Negative for shortness of breath.    Musculoskeletal:  Positive for myalgias.   Gastrointestinal:  Negative for abdominal pain.   Neurological:  Negative for headaches.       Social History     Tobacco Use    Smoking status: Never     Passive exposure: Never    Smokeless tobacco: Never   Substance Use Topics    Alcohol use: Yes     Alcohol/week: 7.0 standard drinks of alcohol     Types: 7 Cans of beer per week     O:   Vitals:    02/03/25 1717   BP: 118/90   Pulse: 84   SpO2: 93%   Weight: 100 kg (221 lb)   Height: 165.1 cm (65\")   PainSc: 0-No pain     Body mass index is 36.78 kg/m².  Vitals and nursing note reviewed.   Constitutional:       Appearance: Well-developed.   HENT:      Head: Normocephalic and atraumatic.   Neck:      Thyroid: No thyromegaly.      Vascular: No JVD.   Pulmonary:      Effort: " Pulmonary effort is normal. No respiratory distress.      Breath sounds: Normal breath sounds. No wheezing. No rales.   Cardiovascular:      Normal rate. Regular rhythm. Normal heart sounds.      No gallop.  No friction rub.   Abdominal:      General: Bowel sounds are normal. There is no distension.      Palpations: Abdomen is soft.      Tenderness: There is no abdominal tenderness. There is no guarding or rebound.   Musculoskeletal:      Cervical back: Neck supple. Skin:     General: Skin is warm and dry.   Neurological:      Mental Status: Alert.      Gait: Gait normal.   Psychiatric:         Behavior: Behavior normal.       Left heart cath 12/4/24 Diagnostic Findings  Cardiac Arteries and Lesion Findings  Report Status: Finalized  Patient Name: KYA CALVO MRN: [id number] Date of study: 12/06/2024 14:32  Page: 1 of 3  LMCA: Normal. Left main coronary artery is a large-caliber vessel and is normal  LAD: Normal. Left interior descending artery is a large-caliber vessel. Proximal third of the arteries normal, mid LAD after the  second diagonal branch shows a 30% concentric stenosis with the distal half of the artery being normal. All diagonal branches  and septal perforators are normal  LCx: Normal. Left circumflex coronary artery is a large-caliber codominant vessel and is normal. Marginal branches of circumflex  are normal  RCA: Normal. Right coronary artery is a small caliber codominant vessel and is normal  [facility]  Ventriculography Findings  Left ventricular size, wall thickness and contractility is normal. Overall ejection fraction is 55%.  Diagnoses and all orders for this visit:    1. Medicare annual wellness visit, subsequent (Primary)    2. Coronary artery disease involving native coronary artery of native heart without angina pectoris  -     Lipid Panel  -     losartan (COZAAR) 100 MG tablet; 1 po daily  Dispense: 90 tablet; Refill: 3  -     Semaglutide,0.25 or 0.5MG/DOS, (Ozempic, 0.25 or 0.5 MG/DOSE,)  2 MG/3ML solution pen-injector; 0.25mg sc weekly x 2 w, then 0.5mg weekly and call for 1mg dose  Dispense: 3 mL; Refill: 0    3. Dyslipidemia  -     Comprehensive Metabolic Panel  -     Lipid Panel    4. Primary hypertension  -     losartan (COZAAR) 100 MG tablet; 1 po daily  Dispense: 90 tablet; Refill: 3    5. Myalgia  -     TSH  -     CBC (No Diff)  -     Magnesium    6. Other fatigue  -     Comprehensive Metabolic Panel  -     TSH  -     CBC (No Diff)    7. Vitamin D deficiency  -     Vitamin D,25-Hydroxy    8. Obesity (BMI 30-39.9)  -     Semaglutide,0.25 or 0.5MG/DOS, (Ozempic, 0.25 or 0.5 MG/DOSE,) 2 MG/3ML solution pen-injector; 0.25mg sc weekly x 2 w, then 0.5mg weekly and call for 1mg dose  Dispense: 3 mL; Refill: 0    Cad - would benefit from GLP1 to reduce cardiac risks and help weight loss;   I disucssed statin and does not cause dementia;  I explained theory that started this but has been debunked and study.  It is actually felt to be neuroproctective, but still declines;  I also recommend daily aspirin.    Bp uncontrolled - increase losartan; also discussed controlling bp more likely to be neuroprotective by avoiding vascular insults to the brain;     Check labs for fatigue and muscle spasms; denies claudication;   due check vitamin D  If ozempic expensive or not covered consider apply for keylan assistance    Return in about 3 months (around 5/3/2025), or if symptoms worsen or fail to improve.

## 2025-02-09 NOTE — PATIENT INSTRUCTIONS
Advance Care Planning and Advance Directives     You make decisions on a daily basis - decisions about where you want to live, your career, your home, your life. Perhaps one of the most important decisions you face is your choice for future medical care. Take time to talk with your family and your healthcare team and start planning today.  Advance Care Planning is a process that can help you:  Understand possible future healthcare decisions in light of your own experiences  Reflect on those decision in light of your goals and values  Discuss your decisions with those closest to you and the healthcare professionals that care for you  Make a plan by creating a document that reflects your wishes    Surrogate Decision Maker  In the event of a medical emergency, which has left you unable to communicate or to make your own decisions, you would need someone to make decisions for you.  It is important to discuss your preferences for medical treatment with this person while you are in good health.     Qualities of a surrogate decision maker:  Willing to take on this role and responsibility  Knows what you want for future medical care  Willing to follow your wishes even if they don't agree with them  Able to make difficult medical decisions under stressful circumstances    Advance Directives  These are legal documents you can create that will guide your healthcare team and decision maker(s) when needed. These documents can be stored in the electronic medical record.    Living Will - a legal document to guide your care if you have a terminal condition or a serious illness and are unable to communicate. States vary by statute in document names/types, but most forms may include one or more of the following:        -  Directions regarding life-prolonging treatments        -  Directions regarding artificially provided nutrition/hydration        -  Choosing a healthcare decision maker        -  Direction regarding organ/tissue  donation    Durable Power of  for Healthcare - this document names an -in-fact to make medical decisions for you, but it may also allow this person to make personal and financial decisions for you. Please seek the advice of an  if you need this type of document.    **Advance Directives are not required and no one may discriminate against you if you do not sign one.    Medical Orders  Many states allow specific forms/orders signed by your physician to record your wishes for medical treatment in your current state of health. This form, signed in personal communication with your physician, addresses resuscitation and other medical interventions that you may or may not want.      For more information or to schedule a time with a [facility] Advance Care Planning Facilitator contact: [facility] or call [phone] and someone will contact you directly.Calorie Counting for Weight Loss  Calories are units of energy. Your body needs a certain number of calories from food to keep going throughout the day. When you eat or drink more calories than your body needs, your body stores the extra calories mostly as fat. When you eat or drink fewer calories than your body needs, your body burns fat to get the energy it needs.  Calorie counting means keeping track of how many calories you eat and drink each day. Calorie counting can be helpful if you need to lose weight. If you eat fewer calories than your body needs, you should lose weight. Ask your health care provider what a healthy weight is for you.  For calorie counting to work, you will need to eat the right number of calories each day to lose a healthy amount of weight per week. A dietitian can help you figure out how many calories you need in a day and will suggest ways to reach your calorie goal.  A healthy amount of weight to lose each week is usually 1-2 lb (0.5-0.9 kg). This usually means that your daily calorie intake should be  reduced by 500-750 calories.  Eating 1,200-1,500 calories a day can help most women lose weight.  Eating 1,500-1,800 calories a day can help most men lose weight.  What do I need to know about calorie counting?  Work with your health care provider or dietitian to determine how many calories you should get each day. To meet your daily calorie goal, you will need to:  Find out how many calories are in each food that you would like to eat. Try to do this before you eat.  Decide how much of the food you plan to eat.  Keep a food log. Do this by writing down what you ate and how many calories it had.  To successfully lose weight, it is important to balance calorie counting with a healthy lifestyle that includes regular activity.  Where do I find calorie information?  The number of calories in a food can be found on a Nutrition Facts label. If a food does not have a Nutrition Facts label, try to look up the calories online or ask your dietitian for help.  Remember that calories are listed per serving. If you choose to have more than one serving of a food, you will have to multiply the calories per serving by the number of servings you plan to eat. For example, the label on a package of bread might say that a serving size is 1 slice and that there are 90 calories in a serving. If you eat 1 slice, you will have eaten 90 calories. If you eat 2 slices, you will have eaten 180 calories.  How do I keep a food log?  After each time that you eat, record the following in your food log as soon as possible:  What you ate. Be sure to include toppings, sauces, and other extras on the food.  How much you ate. This can be measured in cups, ounces, or number of items.  How many calories were in each food and drink.  The total number of calories in the food you ate.  Keep your food log near you, such as in a pocket-sized notebook or on an renée or website on your mobile phone. Some programs will calculate calories for you and show you how  many calories you have left to meet your daily goal.  What are some portion-control tips?  Know how many calories are in a serving. This will help you know how many servings you can have of a certain food.  Use a measuring cup to measure serving sizes. You could also try weighing out portions on a kitchen scale. With time, you will be able to estimate serving sizes for some foods.  Take time to put servings of different foods on your favorite plates or in your favorite bowls and cups so you know what a serving looks like.  Try not to eat straight from a food's packaging, such as from a bag or box. Eating straight from the package makes it hard to see how much you are eating and can lead to overeating. Put the amount you would like to eat in a cup or on a plate to make sure you are eating the right portion.  Use smaller plates, glasses, and bowls for smaller portions and to prevent overeating.  Try not to multitask. For example, avoid watching TV or using your computer while eating. If it is time to eat, sit down at a table and enjoy your food. This will help you recognize when you are full. It will also help you be more mindful of what and how much you are eating.  What are tips for following this plan?  Reading food labels  Check the calorie count compared with the serving size. The serving size may be smaller than what you are used to eating.  Check the source of the calories. Try to choose foods that are high in protein, fiber, and vitamins, and low in saturated fat, trans fat, and sodium.  Shopping  Read nutrition labels while you shop. This will help you make healthy decisions about which foods to buy.  Pay attention to nutrition labels for low-fat or fat-free foods. These foods sometimes have the same number of calories or more calories than the full-fat versions. They also often have added sugar, starch, or salt to make up for flavor that was removed with the fat.  Make a grocery list of lower-calorie foods  and stick to it.  Cooking  Try to cook your favorite foods in a healthier way. For example, try baking instead of frying.  Use low-fat dairy products.  Meal planning  Use more fruits and vegetables. One-half of your plate should be fruits and vegetables.  Include lean proteins, such as chicken, turkey, and fish.  Lifestyle  Each week, aim to do one of the followin minutes of moderate exercise, such as walking.  75 minutes of vigorous exercise, such as running.  General information  Know how many calories are in the foods you eat most often. This will help you calculate calorie counts faster.  Find a way of tracking calories that works for you. Get creative. Try different apps or programs if writing down calories does not work for you.  What foods should I eat?    Eat nutritious foods. It is better to have a nutritious, high-calorie food, such as an avocado, than a food with few nutrients, such as a bag of potato chips.  Use your calories on foods and drinks that will fill you up and will not leave you hungry soon after eating.  Examples of foods that fill you up are nuts and nut butters, vegetables, lean proteins, and high-fiber foods such as whole grains. High-fiber foods are foods with more than 5 g of fiber per serving.  Pay attention to calories in drinks. Low-calorie drinks include water and unsweetened drinks.  The items listed above may not be a complete list of foods and beverages you can eat. Contact a dietitian for more information.  What foods should I limit?  Limit foods or drinks that are not good sources of vitamins, minerals, or protein or that are high in unhealthy fats. These include:  Candy.  Other sweets.  Sodas, specialty coffee drinks, alcohol, and juice.  The items listed above may not be a complete list of foods and beverages you should avoid. Contact a dietitian for more information.  How do I count calories when eating out?  Pay attention to portions. Often, portions are much larger  when eating out. Try these tips to keep portions smaller:  Consider sharing a meal instead of getting your own.  If you get your own meal, eat only half of it. Before you start eating, ask for a container and put half of your meal into it.  When available, consider ordering smaller portions from the menu instead of full portions.  Pay attention to your food and drink choices. Knowing the way food is cooked and what is included with the meal can help you eat fewer calories.  If calories are listed on the menu, choose the lower-calorie options.  Choose dishes that include vegetables, fruits, whole grains, low-fat dairy products, and lean proteins.  Choose items that are boiled, broiled, grilled, or steamed. Avoid items that are buttered, battered, fried, or served with cream sauce. Items labeled as crispy are usually fried, unless stated otherwise.  Choose water, low-fat milk, unsweetened iced tea, or other drinks without added sugar. If you want an alcoholic beverage, choose a lower-calorie option, such as a glass of wine or light beer.  Ask for dressings, sauces, and syrups on the side. These are usually high in calories, so you should limit the amount you eat.  If you want a salad, choose a garden salad and ask for grilled meats. Avoid extra toppings such as keith, cheese, or fried items. Ask for the dressing on the side, or ask for olive oil and vinegar or lemon to use as dressing.  Estimate how many servings of a food you are given. Knowing serving sizes will help you be aware of how much food you are eating at restaurants.  Where to find more information  Centers for Disease Control and Prevention: www.cdc.gov  [place] Department of Agriculture: myplate.gov  Summary  Calorie counting means keeping track of how many calories you eat and drink each day. If you eat fewer calories than your body needs, you should lose weight.  A healthy amount of weight to lose per week is usually 1-2 lb (0.5-0.9 kg). This usually  means reducing your daily calorie intake by 500-750 calories.  The number of calories in a food can be found on a Nutrition Facts label. If a food does not have a Nutrition Facts label, try to look up the calories online or ask your dietitian for help.  Use smaller plates, glasses, and bowls for smaller portions and to prevent overeating.  Use your calories on foods and drinks that will fill you up and not leave you hungry shortly after a meal.  This information is not intended to replace advice given to you by your health care provider. Make sure you discuss any questions you have with your health care provider.  Document Revised: 01/28/2021 Document Reviewed: 01/28/2021  [employer] Patient Education © 2022 [employer]    Exercising to Lose Weight  Getting regular exercise is important for everyone. It is especially important if you are overweight. Being overweight increases your risk of heart disease, stroke, diabetes, high blood pressure, and several types of cancer. Exercising, and reducing the calories you consume, can help you lose weight and improve fitness and health.  Exercise can be moderate or vigorous intensity. To lose weight, most people need to do a certain amount of moderate or vigorous-intensity exercise each week.  How can exercise affect me?  You lose weight when you exercise enough to burn more calories than you eat. Exercise also reduces body fat and builds muscle. The more muscle you have, the more calories you burn. Exercise also:  Improves mood.  Reduces stress and tension.  Improves your overall fitness, flexibility, and endurance.  Increases bone strength.  Moderate-intensity exercise  Moderate-intensity exercise is any activity that gets you moving enough to burn at least three times more energy (calories) than if you were sitting.  Examples of moderate exercise include:  Walking a mile in 15 minutes.  Doing light yard work.  Biking at an easy pace.  Most people should get at least 150  minutes of moderate-intensity exercise a week to maintain their body weight.  Vigorous-intensity exercise  Vigorous-intensity exercise is any activity that gets you moving enough to burn at least six times more calories than if you were sitting. When you exercise at this intensity, you should be working hard enough that you are not able to carry on a conversation.  Examples of vigorous exercise include:  Running.  Playing a team sport, such as football, basketball, and soccer.  Jumping rope.  Most people should get at least 75 minutes a week of vigorous exercise to maintain their body weight.  What actions can I take to lose weight?  The amount of exercise you need to lose weight depends on:  Your age.  The type of exercise.  Any health conditions you have.  Your overall physical ability.  Talk to your health care provider about how much exercise you need and what types of activities are safe for you.  Nutrition    Make changes to your diet as told by your health care provider or diet and nutrition specialist (dietitian). This may include:  Eating fewer calories.  Eating more protein.  Eating less unhealthy fats.  Eating a diet that includes fresh fruits and vegetables, whole grains, low-fat dairy products, and lean protein.  Avoiding foods with added fat, salt, and sugar.  Drink plenty of water while you exercise to prevent dehydration or heat stroke.  Activity  Choose an activity that you enjoy and set realistic goals. Your health care provider can help you make an exercise plan that works for you.  Exercise at a moderate or vigorous intensity most days of the week.  The intensity of exercise may vary from person to person. You can tell how intense a workout is for you by paying attention to your breathing and heartbeat. Most people will notice their breathing and heartbeat get faster with more intense exercise.  Do resistance training twice each week, such as:  Push-ups.  Sit-ups.  Lifting weights.  Using  resistance bands.  Getting short amounts of exercise can be just as helpful as long, structured periods of exercise. If you have trouble finding time to exercise, try doing these things as part of your daily routine:  Get up, stretch, and walk around every 30 minutes throughout the day.  Go for a walk during your lunch break.  Park your car farther away from your destination.  If you take public transportation, get off one stop early and walk the rest of the way.  Make phone calls while standing up and walking around.  Take the stairs instead of elevators or escalators.  Wear comfortable clothes and shoes with good support.  Do not exercise so much that you hurt yourself, feel dizzy, or get very short of breath.  Where to find more information  [place] Department of Health and Human Services: www.hhs.gov  Centers for Disease Control and Prevention: www.cdc.gov  Contact a health care provider:  Before starting a new exercise program.  If you have questions or concerns about your weight.  If you have a medical problem that keeps you from exercising.  Get help right away if:  You have any of the following while exercising:  Injury.  Dizziness.  Difficulty breathing or shortness of breath that does not go away when you stop exercising.  Chest pain.  Rapid heartbeat.  These symptoms may represent a serious problem that is an emergency. Do not wait to see if the symptoms will go away. Get medical help right away. Call your local emergency services (911 in the [place] Do not drive yourself to the hospital.  Summary  Getting regular exercise is especially important if you are overweight.  Being overweight increases your risk of heart disease, stroke, diabetes, high blood pressure, and several types of cancer.  Losing weight happens when you burn more calories than you eat.  Reducing the amount of calories you eat, and getting regular moderate or vigorous exercise each week, helps you lose weight.  This information is not  "intended to replace advice given to you by your health care provider. Make sure you discuss any questions you have with your health care provider.  Document Revised: 02/13/2022 Document Reviewed: 02/13/2022  [employer] Patient Education © 2022 [employer] Statins and Cognition  The theory that statins (cholesterol-lowering medications) could cause or increase the risk of dementia emerged primarily due to concerns about the role of cholesterol in brain function and the potential for statins to interfere with cognitive processes. Here's a brief explanation of the origin of this theory and how it gained traction:    Key Factors Behind the Statin-Dementia Theory:  Cholesterol is essential in the brain: The brain has a high concentration of cholesterol, which is necessary for neuronal membrane stability, myelin sheath integrity, and synaptic function. It was theorized that excessively lowering cholesterol with statins could impair these functions and lead to cognitive decline.  Statins cross the blood-brain barrier: Lipophilic statins (e.g., simvastatin, atorvastatin) can cross into the central nervous system (CNS), raising concerns about potential effects on neurotransmitter signaling or brain metabolism.  Observational reports of cognitive issues: Early clinical reports (mainly case studies and patient anecdotes) described memory loss, confusion, and other cognitive complaints in some statin users. This was largely speculative at the time but randi attention from patients and physicians.    Who Started the Theory?  The theory wasn't traced to a single person, but several groups and researchers contributed to its development:  Dr. Duane Graveline, MD (2000s):  One of the most vocal proponents of the statin-dementia link. Florina, a former [employer] and flight surgeon, published a personal account of experiencing \"transient global amnesia\" (a sudden, temporary loss of memory) while taking statins.  His book, " "\"Lipitor: Thief of Memory\" (2005), described his cognitive issues and argued that statins could cause memory loss, confusion, and dementia, particularly in older patients.  Jjsoha's anecdotal experience gained attention, though many experts criticized his claims as lacking scientific backing.  FDA Warning (2012):  The FDA issued a safety communication warning that statins could cause \"memory loss and confusion\" in some individuals, but it was generally considered non-serious and reversible. This fueled public concern, as the warning suggested a possible association between statins and cognitive decline.  The FDA did not claim that statins cause dementia, but the precautionary statement caused widespread debate.  Observational Studies:  Some early observational studies suggested a possible link between statins and cognitive dysfunction:  For example, case reports and small studies in the late 1990s and early 2000s noted instances of short-term memory problems in statin users. These studies were limited and often lacked control for confounding variables like pre-existing cognitive decline.    Scientific Counterarguments and Findings:  Major Studies (2013 and beyond): Larger, more rigorous studies (e.g., DYLAN trial and PROSPER trial) largely debunked the theory by showing:  No increased risk of dementia from statin use.  Some studies even found protective effects, particularly in preventing vascular dementia and Alzheimer's disease by reducing cardiovascular risk factors.  The FDA Clarification (2014): The FDA clarified that while reversible cognitive effects (e.g., memory loss) could occur, statins do not increase the risk of dementia.  Summary: Why Statins Are Linked to Cognitive Function and Dementia  The concern about statins and cognitive function arose due to their ability to lower cholesterol, a vital component for brain cell membranes, myelin sheaths, and synaptic activity.  Early anecdotal reports and " small studies raised concerns about memory loss, confusion, or transient cognitive dysfunction linked to statin use.  However, subsequent large clinical trials, meta-analyses, and observational studies have debunked the theory that statins increase the risk of long-term cognitive decline or dementia.  Instead, statins are now increasingly recognized for their potential neuroprotective role by reducing cardiovascular risk factors (e.g., stroke, vascular dementia) and improving brain blood flow.    Key Studies & Summaries by Date    1. PROSPER Trial (2002)  Title: PROspective Study of Pravastatin in the Elderly at Risk  Summary: In this large trial involving patients aged 70-82, statin use did not worsen cognitive function compared to placebo over 3 years.  Conclusion: Statins are safe for older adults in terms of cognitive health.    2. FDA Warning (2012)  The FDA issued a warning acknowledging that short-term reversible cognitive symptoms (e.g., confusion, memory loss) may occur with statin use.  No evidence was found linking statins to dementia or permanent cognitive decline.    3. DYLAN Trial Sub-analysis (2013)  Title: Justification for the Use of Statins in Prevention: An Intervention Trial Evaluating Rosuvastatin  Summary: The sub-analysis of the DYLAN trial showed no cognitive decline among statin users, even in patients at high risk of cardiovascular disease.  Conclusion: Statins do not cause cognitive impairment and may benefit vascular-related cognition.    4. Heart Protection Study (2014)  Summary: This large trial of over 20,000 participants found no difference in cognitive function between patients on statins and those not taking them.  Conclusion: Statins are safe in terms of brain function, even in long-term use.    5. [place]-analysis of 25 Studies (2017)  Title: Impact of Statins on Cognitive Decline: Systematic Review and [place]-Analysis  Summary: Reviewed 25 studies (1.4 million participants) and  found no increased risk of cognitive decline or dementia.  Some studies even showed lower risk of Alzheimer's disease in long-term statin users.  Conclusion: Statins are neutral or protective regarding cognitive health.    6. [facility] Review (2018)  Summary: A systematic review found no conclusive evidence that statins impair memory or increase the risk of dementia. Some observational data showed that statins might delay the onset of dementia in high-risk individuals.    7. [place]-analysis of 8 Cohort Studies (2021)  Title: Statin Use and Cognitive Decline: A Longitudinal Study Review  Summary: Found a significant association between statin use and reduced cognitive decline, especially in patients with vascular risk factors.  Conclusion: Statins may help prevent or slow vascular dementia progression.    8. Neurology Journal Study (2023)  Summary: A large cohort study concluded that long-term statin use was associated with a reduced risk of developing dementia in older adults, particularly vascular dementia.  Conclusion: Statins provide protective effects by reducing cardiovascular risks and microvascular damage to the brain.    Key Takeaway:  Initial concerns about statins and dementia have been debunked by large, well-designed trials and meta-analyses.  Statins are not associated with cognitive decline and may reduce the risk of vascular dementia and Alzheimer's disease, particularly in patients with high cardiovascular risk.

## 2025-02-10 DIAGNOSIS — I25.10 CORONARY ARTERY DISEASE INVOLVING NATIVE CORONARY ARTERY OF NATIVE HEART WITHOUT ANGINA PECTORIS: Primary | ICD-10-CM

## 2025-02-10 DIAGNOSIS — E66.9 OBESITY (BMI 30-39.9): ICD-10-CM

## 2025-02-10 DIAGNOSIS — E78.5 DYSLIPIDEMIA: ICD-10-CM

## 2025-02-10 DIAGNOSIS — I25.10 CORONARY ARTERY DISEASE INVOLVING NATIVE CORONARY ARTERY OF NATIVE HEART WITHOUT ANGINA PECTORIS: ICD-10-CM

## 2025-02-10 RX ORDER — SEMAGLUTIDE 0.25 MG/.5ML
0.25 INJECTION, SOLUTION SUBCUTANEOUS WEEKLY
Qty: 2 ML | Refills: 2 | Status: SHIPPED | OUTPATIENT
Start: 2025-02-10 | End: 2025-02-10 | Stop reason: SDUPTHER

## 2025-02-10 RX ORDER — SEMAGLUTIDE 0.25 MG/.5ML
0.25 INJECTION, SOLUTION SUBCUTANEOUS WEEKLY
Qty: 2 ML | Refills: 2 | Status: SHIPPED | OUTPATIENT
Start: 2025-02-10

## 2025-02-21 RX ORDER — AMOXICILLIN 875 MG/1
875 TABLET, COATED ORAL 2 TIMES DAILY
Qty: 20 TABLET | Refills: 0 | Status: SHIPPED | OUTPATIENT
Start: 2025-02-21

## 2025-02-28 RX ORDER — METHYLPREDNISOLONE 4 MG/1
TABLET ORAL
Qty: 21 TABLET | Refills: 0 | Status: SHIPPED | OUTPATIENT
Start: 2025-02-28

## 2025-03-16 DIAGNOSIS — I25.10 CORONARY ARTERY DISEASE INVOLVING NATIVE CORONARY ARTERY OF NATIVE HEART WITHOUT ANGINA PECTORIS: ICD-10-CM

## 2025-03-16 DIAGNOSIS — E78.5 DYSLIPIDEMIA: ICD-10-CM

## 2025-03-16 DIAGNOSIS — E66.9 OBESITY (BMI 30-39.9): ICD-10-CM

## 2025-03-16 RX ORDER — SEMAGLUTIDE 0.25 MG/.5ML
0.5 INJECTION, SOLUTION SUBCUTANEOUS WEEKLY
Qty: 2 ML | Refills: 2 | Status: SHIPPED | OUTPATIENT
Start: 2025-03-16

## 2025-03-19 DIAGNOSIS — I25.10 CORONARY ARTERY DISEASE INVOLVING NATIVE CORONARY ARTERY OF NATIVE HEART WITHOUT ANGINA PECTORIS: Primary | ICD-10-CM

## 2025-03-19 RX ORDER — SEMAGLUTIDE 0.5 MG/.5ML
0.5 INJECTION, SOLUTION SUBCUTANEOUS WEEKLY
Qty: 2 ML | Refills: 2 | Status: SHIPPED | OUTPATIENT
Start: 2025-03-19

## 2025-04-14 ENCOUNTER — TELEPHONE (OUTPATIENT)
Dept: FAMILY MEDICINE CLINIC | Facility: CLINIC | Age: 65
End: 2025-04-14
Payer: MEDICARE

## 2025-04-14 RX ORDER — TOBRAMYCIN 3 MG/ML
2 SOLUTION/ DROPS OPHTHALMIC EVERY 6 HOURS SCHEDULED
Qty: 5 ML | Refills: 0 | Status: SHIPPED | OUTPATIENT
Start: 2025-04-14

## 2025-04-14 NOTE — TELEPHONE ENCOUNTER
Caller: Loren Nur    Relationship: Self    Best call back number: [phone]    What medication are you requesting: PCP RECOMMENDATIONS     What are your current symptoms: LEFT EYE RED, ITCHY AND DRAINING (WAS EXPOSED TO PINK EYE)     How long have you been experiencing symptoms: 2 DAYS     If a prescription is needed, what is your preferred pharmacy and phone number: [facility] - [place], [place] - [address] [place] [address] [phone] [facility] [phone] FX

## 2025-05-02 DIAGNOSIS — F32.5 MAJOR DEPRESSIVE DISORDER WITH SINGLE EPISODE, IN FULL REMISSION: ICD-10-CM

## 2025-05-02 DIAGNOSIS — K21.9 GASTROESOPHAGEAL REFLUX DISEASE, UNSPECIFIED WHETHER ESOPHAGITIS PRESENT: ICD-10-CM

## 2025-05-02 RX ORDER — OMEPRAZOLE 40 MG/1
40 CAPSULE, DELAYED RELEASE ORAL DAILY
Qty: 90 CAPSULE | Refills: 1 | Status: SHIPPED | OUTPATIENT
Start: 2025-05-02

## 2025-05-02 RX ORDER — BUPROPION HYDROCHLORIDE 300 MG/1
300 TABLET ORAL EVERY MORNING
Qty: 90 TABLET | Refills: 1 | Status: SHIPPED | OUTPATIENT
Start: 2025-05-02

## 2025-05-07 DIAGNOSIS — I25.10 CORONARY ARTERY DISEASE INVOLVING NATIVE CORONARY ARTERY OF NATIVE HEART WITHOUT ANGINA PECTORIS: ICD-10-CM

## 2025-05-07 RX ORDER — SEMAGLUTIDE 0.5 MG/.5ML
0.5 INJECTION, SOLUTION SUBCUTANEOUS WEEKLY
Qty: 2 ML | Refills: 2 | Status: SHIPPED | OUTPATIENT
Start: 2025-05-07

## 2025-05-22 DIAGNOSIS — I25.10 CORONARY ARTERY DISEASE INVOLVING NATIVE CORONARY ARTERY OF NATIVE HEART WITHOUT ANGINA PECTORIS: ICD-10-CM

## 2025-05-22 RX ORDER — SEMAGLUTIDE 0.5 MG/.5ML
0.2 INJECTION, SOLUTION SUBCUTANEOUS WEEKLY
Qty: 2 ML | Refills: 5 | Status: SHIPPED | OUTPATIENT
Start: 2025-05-22

## 2025-07-15 DIAGNOSIS — I25.10 CORONARY ARTERY DISEASE INVOLVING NATIVE CORONARY ARTERY OF NATIVE HEART WITHOUT ANGINA PECTORIS: ICD-10-CM

## 2025-07-15 RX ORDER — SEMAGLUTIDE 0.5 MG/.5ML
0.3 INJECTION, SOLUTION SUBCUTANEOUS WEEKLY
Qty: 2 ML | Refills: 5 | Status: SHIPPED | OUTPATIENT
Start: 2025-07-15 | End: 2025-07-16 | Stop reason: SDUPTHER

## 2025-07-16 DIAGNOSIS — I25.10 CORONARY ARTERY DISEASE INVOLVING NATIVE CORONARY ARTERY OF NATIVE HEART WITHOUT ANGINA PECTORIS: ICD-10-CM

## 2025-07-16 RX ORDER — SEMAGLUTIDE 0.5 MG/.5ML
0.3 INJECTION, SOLUTION SUBCUTANEOUS WEEKLY
Qty: 2 ML | Refills: 5 | Status: SHIPPED | OUTPATIENT
Start: 2025-07-16

## 2025-07-25 ENCOUNTER — TELEPHONE (OUTPATIENT)
Dept: FAMILY MEDICINE CLINIC | Facility: CLINIC | Age: 65
End: 2025-07-25
Payer: MEDICARE

## 2025-07-25 DIAGNOSIS — R53.83 OTHER FATIGUE: Primary | ICD-10-CM

## 2025-07-25 RX ORDER — SEMAGLUTIDE 2.4 MG/.75ML
0.34 INJECTION, SOLUTION SUBCUTANEOUS WEEKLY
Qty: 4 ML | Refills: 12 | Status: SHIPPED | OUTPATIENT
Start: 2025-07-25 | End: 2025-07-26 | Stop reason: SDUPTHER

## 2025-07-26 DIAGNOSIS — R53.83 OTHER FATIGUE: ICD-10-CM

## 2025-07-26 RX ORDER — SEMAGLUTIDE 2.4 MG/.75ML
0.34 INJECTION, SOLUTION SUBCUTANEOUS WEEKLY
Qty: 4 ML | Refills: 12 | Status: SHIPPED | OUTPATIENT
Start: 2025-07-26 | End: 2025-07-26 | Stop reason: SDUPTHER

## 2025-07-26 RX ORDER — SEMAGLUTIDE 2.4 MG/.75ML
0.34 INJECTION, SOLUTION SUBCUTANEOUS WEEKLY
Qty: 4 ML | Refills: 12 | Status: SHIPPED | OUTPATIENT
Start: 2025-07-26

## 2025-08-13 ENCOUNTER — OFFICE VISIT (OUTPATIENT)
Dept: FAMILY MEDICINE CLINIC | Facility: CLINIC | Age: 65
End: 2025-08-13
Payer: MEDICARE

## 2025-08-13 VITALS
HEART RATE: 72 BPM | BODY MASS INDEX: 34.99 KG/M2 | HEIGHT: 65 IN | WEIGHT: 210 LBS | DIASTOLIC BLOOD PRESSURE: 92 MMHG | SYSTOLIC BLOOD PRESSURE: 146 MMHG | OXYGEN SATURATION: 97 %

## 2025-08-13 DIAGNOSIS — M54.6 ACUTE LEFT-SIDED THORACIC BACK PAIN: ICD-10-CM

## 2025-08-13 DIAGNOSIS — R10.9 FLANK PAIN: Primary | ICD-10-CM

## 2025-08-13 RX ORDER — PERPHENAZINE 4 MG
TABLET ORAL
COMMUNITY

## 2025-08-13 RX ORDER — NAPROXEN 500 MG/1
500 TABLET ORAL 2 TIMES DAILY WITH MEALS
Qty: 28 TABLET | Refills: 0 | Status: SHIPPED | OUTPATIENT
Start: 2025-08-13

## 2025-08-13 RX ORDER — AMOXICILLIN 875 MG/1
875 TABLET, COATED ORAL 2 TIMES DAILY
Qty: 10 TABLET | Refills: 0 | Status: SHIPPED | OUTPATIENT
Start: 2025-08-13

## 2025-08-24 DIAGNOSIS — I10 PRIMARY HYPERTENSION: ICD-10-CM

## 2025-08-24 DIAGNOSIS — I25.10 CORONARY ARTERY DISEASE INVOLVING NATIVE CORONARY ARTERY OF NATIVE HEART WITHOUT ANGINA PECTORIS: ICD-10-CM

## 2025-08-24 DIAGNOSIS — K21.9 GASTROESOPHAGEAL REFLUX DISEASE, UNSPECIFIED WHETHER ESOPHAGITIS PRESENT: ICD-10-CM

## 2025-08-24 DIAGNOSIS — F32.5 MAJOR DEPRESSIVE DISORDER WITH SINGLE EPISODE, IN FULL REMISSION: ICD-10-CM

## 2025-08-25 RX ORDER — LOSARTAN POTASSIUM 100 MG/1
100 TABLET ORAL DAILY
Qty: 90 TABLET | Refills: 1 | Status: SHIPPED | OUTPATIENT
Start: 2025-08-25

## 2025-08-25 RX ORDER — BUPROPION HYDROCHLORIDE 300 MG/1
300 TABLET ORAL EVERY MORNING
Qty: 90 TABLET | Refills: 1 | Status: SHIPPED | OUTPATIENT
Start: 2025-08-25

## 2025-08-25 RX ORDER — METOPROLOL SUCCINATE 50 MG/1
25 TABLET, EXTENDED RELEASE ORAL DAILY
Qty: 15 TABLET | Refills: 5 | Status: SHIPPED | OUTPATIENT
Start: 2025-08-25 | End: 2026-08-25

## 2025-08-25 RX ORDER — OMEPRAZOLE 40 MG/1
40 CAPSULE, DELAYED RELEASE ORAL DAILY
Qty: 90 CAPSULE | Refills: 1 | Status: SHIPPED | OUTPATIENT
Start: 2025-08-25